# Patient Record
Sex: FEMALE | Race: WHITE | NOT HISPANIC OR LATINO | Employment: FULL TIME | URBAN - METROPOLITAN AREA
[De-identification: names, ages, dates, MRNs, and addresses within clinical notes are randomized per-mention and may not be internally consistent; named-entity substitution may affect disease eponyms.]

---

## 2017-01-14 ENCOUNTER — OFFICE VISIT (OUTPATIENT)
Dept: URGENT CARE | Facility: PHYSICIAN GROUP | Age: 22
End: 2017-01-14
Payer: COMMERCIAL

## 2017-01-14 VITALS
HEART RATE: 134 BPM | DIASTOLIC BLOOD PRESSURE: 62 MMHG | TEMPERATURE: 101.3 F | SYSTOLIC BLOOD PRESSURE: 120 MMHG | HEIGHT: 64 IN | BODY MASS INDEX: 24.24 KG/M2 | RESPIRATION RATE: 20 BRPM | WEIGHT: 142 LBS | OXYGEN SATURATION: 99 %

## 2017-01-14 DIAGNOSIS — R50.9 FEVER, UNSPECIFIED FEVER CAUSE: ICD-10-CM

## 2017-01-14 DIAGNOSIS — J11.1 FLU: Primary | ICD-10-CM

## 2017-01-14 LAB
FLUAV+FLUBV AG SPEC QL IA: NORMAL
INT CON NEG: NEGATIVE
INT CON NEG: NEGATIVE
INT CON POS: POSITIVE
INT CON POS: POSITIVE
S PYO AG THROAT QL: NORMAL

## 2017-01-14 PROCEDURE — 87804 INFLUENZA ASSAY W/OPTIC: CPT | Performed by: FAMILY MEDICINE

## 2017-01-14 PROCEDURE — 87880 STREP A ASSAY W/OPTIC: CPT | Performed by: FAMILY MEDICINE

## 2017-01-14 PROCEDURE — 99214 OFFICE O/P EST MOD 30 MIN: CPT | Performed by: FAMILY MEDICINE

## 2017-01-14 RX ORDER — IBUPROFEN 200 MG
800 TABLET ORAL ONCE
Status: COMPLETED | OUTPATIENT
Start: 2017-01-14 | End: 2017-01-14

## 2017-01-14 RX ORDER — OSELTAMIVIR PHOSPHATE 75 MG/1
75 CAPSULE ORAL EVERY 12 HOURS
Qty: 10 CAP | Refills: 0 | Status: SHIPPED | OUTPATIENT
Start: 2017-01-14 | End: 2017-01-19

## 2017-01-14 RX ORDER — IBUPROFEN 800 MG/1
800 TABLET ORAL EVERY 8 HOURS PRN
Qty: 20 TAB | Refills: 3 | Status: SHIPPED | OUTPATIENT
Start: 2017-01-14 | End: 2017-11-16

## 2017-01-14 RX ADMIN — Medication 800 MG: at 11:02

## 2017-01-14 ASSESSMENT — ENCOUNTER SYMPTOMS
ORTHOPNEA: 0
MYALGIAS: 1
FEVER: 1
COUGH: 1
SORE THROAT: 1
CHILLS: 0
DIZZINESS: 0
FOCAL WEAKNESS: 0
SPUTUM PRODUCTION: 0

## 2017-01-14 NOTE — MR AVS SNAPSHOT
"        Mindi Velazquez   2017 9:45 AM   Office Visit   MRN: [id number]    Department:  [facility]   Dept Phone:  [phone]    Description:  Female : 1995   Provider:  Sergey Rodríguez M.D.           Reason for Visit     Pharyngitis sore throat, fever, chils, cough and [id number]      Allergies as of 2017     Allergen Noted Reactions    Amoxicillin 04/10/2012   Hives    Cillins [Penicillins] 2016   Anaphylaxis      You were diagnosed with     Flu   [670450]  -  Primary     Fever, unspecified fever cause   [[id number]]         Vital Signs     Blood Pressure Pulse Temperature Respirations Height Weight    120/62 mmHg 134 38.5 °C (101.3 °F) 20 1.626 m (5' 4.02\") 64.411 kg (142 lb)    Body Mass Index Oxygen Saturation Smoking Status             24.36 kg/m2 99% Current Every Day Smoker         Basic Information     Date Of Birth Sex Race Ethnicity Preferred Language    1995 Female White Non- English      Health Maintenance        Date Due Completion Dates    IMM HEP B VACCINE (1 of 3 - Primary Series) 1996 ---    IMM HEP A VACCINE (1 of 2 - Standard Series) 1997 ---    IMM VARICELLA (CHICKENPOX) VACCINE (1 of 2 - 2 Dose Adolescent Series) 2009 ---    IMM MENINGOCOCCAL VACCINE (MCV4) (1 of 1) 2012 ---    IMM DTaP/Tdap/Td Vaccine (1 - Tdap) 2015 ---    IMM INFLUENZA (1) 2016 ---            Results     POCT Influenza A/B      Component    Rapid Influenza A-B    pos A    Internal Control Positive    Positive    Internal Control Negative    Negative                        Current Immunizations     HPV Quadrivalent Vaccine (GARDASIL) 2014, 2014, 2013      Below and/or attached are the medications your provider expects you to take. Review all of your home medications and newly ordered medications with your provider and/or pharmacist. Follow medication instructions as directed by your provider and/or pharmacist. Please keep your " medication list with you and share with your provider. Update the information when medications are discontinued, doses are changed, or new medications (including over-the-counter products) are added; and carry medication information at all times in the event of emergency situations     Allergies:  AMOXICILLIN - Hives     CILLINS - Anaphylaxis               Medications  Valid as of: January 14, 2017 - 10:26 AM    Generic Name Brand Name Tablet Size Instructions for use    Clindamycin HCl   Take  by mouth.        Doxycycline Hyclate (Tab) VIBRAMYCIN 100 MG Take 1 Tab by mouth 2 times a day.        Hydrocodone-Acetaminophen (Tab) NORCO 5-325 MG Take 1 Tab by mouth every 6 hours as needed.        Ibuprofen (Tab) MOTRIN 800 MG Take 1 Tab by mouth every 8 hours as needed.        Ipratropium-Albuterol (Aerosol) COMBIVENT  MCG/ACT Inhale 2 Puffs by mouth 4 times a day.        MedroxyPROGESTERone Acetate   by Intramuscular route.        Oseltamivir Phosphate (Cap) TAMIFLU 75 MG Take 1 Cap by mouth every 12 hours for 5 days.        Phenyleph-Promethazine-Cod (Syrup) PHENERGAN VC CODEINE 5-6.25-10 MG/5ML Take 5 mL by mouth every 8 hours as needed.        .                 Medicines prescribed today were sent to:     [facility]ACY #[id number] - [place] - [address]    [address] [place] [address]    Phone: [phone] Fax: [phone]    Open 24 Hours?: No      Medication refill instructions:       If your prescription bottle indicates you have medication refills left, it is not necessary to call your provider’s office. Please contact your pharmacy and they will refill your medication.    If your prescription bottle indicates you do not have any refills left, you may request refills at any time through one of the following ways: The online Mango system (except Urgent Care), by calling your provider’s office, or by asking your pharmacy to contact your provider’s office with a refill request. Medication refills are  processed only during regular business hours and may not be available until the next business day. Your provider may request additional information or to have a follow-up visit with you prior to refilling your medication.   *Please Note: Medication refills are assigned a new Rx number when refilled electronically. Your pharmacy may indicate that no refills were authorized even though a new prescription for the same medication is available at the pharmacy. Please request the medicine by name with the pharmacy before contacting your provider for a refill.           [employer] Access Code: [email or web address]  Expires: 2/13/2017  9:41 AM    [employer]  A secure, online tool to manage your health information     [employer]’s [employer]® is a secure, online tool that connects you to your personalized health information from the privacy of your home -- day or night - making it very easy for you to manage your healthcare. Once the activation process is completed, you can even access your medical information using the [employer] melisa, which is available for free in the Apple Melisa store or Google Play store.     [employer] provides the following levels of access (as shown below):   My Chart Features   [facility] Doctor [facility]  Specialists [facility]  Urgent  Care Non-[facility]  Primary Care  Doctor   Email your healthcare team securely and privately 24/7 X X X    Manage appointments: schedule your next appointment; view details of past/upcoming appointments X      Request prescription refills. X      View recent personal medical records, including lab and immunizations X X X X   View health record, including health history, allergies, medications X X X X   Read reports about your outpatient visits, procedures, consult and ER notes X X X X   See your discharge summary, which is a recap of your hospital and/or ER visit that includes your diagnosis, lab results, and care plan. X X       How to register for [employer]:  1. Go to   [email or web address].  2. Click on the Sign Up Now box, which takes you to the New Member Sign Up page. You will need to provide the following information:  a. Enter your Sqrrl Access Code exactly as it appears at the top of this page. (You will not need to use this code after you’ve completed the sign-up process. If you do not sign up before the expiration date, you must request a new code.)   b. Enter your date of birth.   c. Enter your home email address.   d. Click Submit, and follow the next screen’s instructions.  3. Create a Sqrrl ID. This will be your Sqrrl login ID and cannot be changed, so think of one that is secure and easy to remember.  4. Create a [employer] password. You can change your password at any time.  5. Enter your Password Reset Question and Answer. This can be used at a later time if you forget your password.   6. Enter your e-mail address. This allows you to receive e-mail notifications when new information is available in Sqrrl.  7. Click Sign Up. You can now view your health information.    For assistance activating your Sqrrl account, call [phone]

## 2017-01-14 NOTE — PROGRESS NOTES
"Subjective:      Mindi Velazquez is a 21 y.o. female who presents with Pharyngitis    Chief Complaint   Patient presents with   • Pharyngitis     sore throat, fever, chils, cough and [id number]        - 1-2 days cough coryza ST fever aches/malaise.               Pharyngitis   Associated symptoms include congestion and coughing.       Review of Systems   Constitutional: Positive for fever and malaise/fatigue. Negative for chills.   HENT: Positive for congestion and sore throat.    Respiratory: Positive for cough. Negative for sputum production.    Cardiovascular: Negative for chest pain and orthopnea.   Musculoskeletal: Positive for myalgias.   Neurological: Negative for dizziness and focal weakness.          Objective:     /62 mmHg  Pulse 134  Temp(Src) 38.5 °C (101.3 °F)  Resp 20  Ht 1.626 m (5' 4.02\")  Wt 64.411 kg (142 lb)  BMI 24.36 kg/m2  SpO2 99%     Physical Exam   Constitutional: She appears well-developed. No distress.   HENT:   Head: Normocephalic and atraumatic.   Mouth/Throat: Oropharynx is clear and moist.   Cardiovascular: Regular rhythm.    No murmur heard.  Pulmonary/Chest: Effort normal and breath sounds normal.   Neurological: She is alert.   Skin: Skin is warm and dry.   Psychiatric: She has a normal mood and affect. Judgment normal.   Nursing note and vitals reviewed.              Assessment/Plan:         1. Flu  oseltamivir (TAMIFLU) 75 MG Cap    prometh-phenylephrine-codeine (PHENERGAN VC CODEINE) 5-6.25-10 MG/5ML Syrup    ibuprofen (MOTRIN) 800 MG Tab   2. Fever, unspecified fever cause  POCT Influenza A/B    POCT Rapid Strep A    ibuprofen (MOTRIN) tablet 800 mg             Dx & d/c instructions discussed w/ patient and/or family members. Follow up w/ Prvt Dr or here in 3-4 days if not getting better, sooner if needed,  ER if worse and UC/PCP unavailable.        Possible side effects (i.e. Rash, GI upset/constipation, sedation, elevation of BP or sugars) of any " medications given discussed.

## 2017-04-08 ENCOUNTER — OFFICE VISIT (OUTPATIENT)
Dept: URGENT CARE | Facility: PHYSICIAN GROUP | Age: 22
End: 2017-04-08
Payer: COMMERCIAL

## 2017-04-08 VITALS
RESPIRATION RATE: 18 BRPM | SYSTOLIC BLOOD PRESSURE: 120 MMHG | HEART RATE: 94 BPM | HEIGHT: 64 IN | WEIGHT: 144 LBS | BODY MASS INDEX: 24.59 KG/M2 | OXYGEN SATURATION: 99 % | TEMPERATURE: 99.1 F | DIASTOLIC BLOOD PRESSURE: 74 MMHG

## 2017-04-08 DIAGNOSIS — J01.00 ACUTE NON-RECURRENT MAXILLARY SINUSITIS: ICD-10-CM

## 2017-04-08 PROCEDURE — 99214 OFFICE O/P EST MOD 30 MIN: CPT | Performed by: PHYSICIAN ASSISTANT

## 2017-04-08 RX ORDER — AZITHROMYCIN 250 MG/1
TABLET, FILM COATED ORAL
Qty: 6 TAB | Refills: 0 | Status: SHIPPED | OUTPATIENT
Start: 2017-04-08 | End: 2017-11-16

## 2017-04-08 ASSESSMENT — ENCOUNTER SYMPTOMS
SINUS PRESSURE: 1
CHILLS: 1
SPUTUM PRODUCTION: 0
DIARRHEA: 0
WHEEZING: 0
SHORTNESS OF BREATH: 0
FEVER: 0
HEADACHES: 1
SWOLLEN GLANDS: 1
VOMITING: 0
PALPITATIONS: 0
SORE THROAT: 1
DIZZINESS: 0
COUGH: 1
NAUSEA: 0
ABDOMINAL PAIN: 0

## 2017-04-08 NOTE — PROGRESS NOTES
Subjective:      Mindi Velazquez is a 21 y.o. female who presents with Sinus Problem            Sinus Problem  This is a new problem. The current episode started in the past 7 days. The problem has been gradually worsening since onset. There has been no fever. The fever has been present for less than 1 day. Associated symptoms include chills, congestion, coughing, ear pain, headaches, sinus pressure, a sore throat and swollen glands. Pertinent negatives include no shortness of breath. Past treatments include nothing. The treatment provided no relief.       PMH:  has a past medical history of Allergy; GERD (gastroesophageal reflux disease); Headache(784.0); Migraine; and IBD (inflammatory bowel disease). She also has no past medical history of ASTHMA, Seizure (CMS-[facility]), or Diabetes.  MEDS:   Current outpatient prescriptions:   •  MedroxyPROGESTERone Acetate (DEPO-PROVERA IM), by Intramuscular route., Disp: , Rfl:   •  CLINDAMYCIN HCL PO, Take  by mouth., Disp: , Rfl:   •  ibuprofen (MOTRIN) 800 MG Tab, Take 1 Tab by mouth every 8 hours as needed., Disp: 20 Tab, Rfl: 3  •  prometh-phenylephrine-codeine (PHENERGAN VC CODEINE) 5-6.25-10 MG/5ML Syrup, Take 5 mL by mouth every 8 hours as needed., Disp: 120 mL, Rfl: 0  •  hydrocodone-acetaminophen (NORCO) 5-325 MG Tab per tablet, Take 1 Tab by mouth every 6 hours as needed., Disp: 15 Tab, Rfl: 0  •  doxycycline (VIBRAMYCIN) 100 MG Tab, Take 1 Tab by mouth 2 times a day., Disp: 20 Tab, Rfl: 0  •  albuterol-ipratropium (COMBIVENT)  MCG/ACT AERO, Inhale 2 Puffs by mouth 4 times a day., Disp: 1 Inhaler, Rfl: 3  ALLERGIES:   Allergies   Allergen Reactions   • Amoxicillin Hives   • Cillins [Penicillins] Anaphylaxis     SURGHX: No past surgical history on file.  SOCHX:  reports that she has been smoking Cigarettes.  She has been smoking about 0.50 packs per day. She has never used smokeless tobacco. She reports that she does not drink alcohol or use illicit drugs.  FH:  "family history includes Cancer in her maternal aunt and paternal grandfather; Genetic in her maternal grandmother; Hypertension in her father; Psychiatry in her mother.      Review of Systems   Constitutional: Positive for chills and malaise/fatigue. Negative for fever.   HENT: Positive for congestion, ear pain, sinus pressure and sore throat.    Respiratory: Positive for cough. Negative for sputum production, shortness of breath and wheezing.    Cardiovascular: Negative for chest pain, palpitations and leg swelling.   Gastrointestinal: Negative for nausea, vomiting, abdominal pain and diarrhea.   Neurological: Positive for headaches. Negative for dizziness.       Medications, Allergies, and current problem list reviewed today in Epic     Objective:     /74 mmHg  Pulse 94  Temp(Src) 37.3 °C (99.1 °F)  Resp 18  Ht 1.626 m (5' 4.02\")  Wt 65.318 kg (144 lb)  BMI 24.71 kg/m2  SpO2 99%     Physical Exam   Constitutional: She is oriented to person, place, and time. She appears well-developed and well-nourished. No distress.   HENT:   Head: Normocephalic and atraumatic.   Right Ear: Hearing, tympanic membrane, external ear and ear canal normal. Tympanic membrane is not erythematous. No decreased hearing is noted.   Left Ear: Hearing, tympanic membrane, external ear and ear canal normal. Tympanic membrane is not erythematous. No decreased hearing is noted.   Nose: Right sinus exhibits maxillary sinus tenderness. Left sinus exhibits maxillary sinus tenderness.   Mouth/Throat: Normal dentition. Posterior oropharyngeal erythema present. No oropharyngeal exudate.   Eyes: Conjunctivae and EOM are normal. Pupils are equal, round, and reactive to light. Right eye exhibits no discharge. Left eye exhibits no discharge. No scleral icterus.   Neck: Normal range of motion. Neck supple.   Cardiovascular: Normal rate, regular rhythm and normal heart sounds.    No murmur heard.  Pulmonary/Chest: Effort normal and breath " sounds normal. No respiratory distress. She has no wheezes.   Lymphadenopathy:        Head (right side): Submandibular adenopathy present.        Head (left side): Submandibular adenopathy present.     She has no cervical adenopathy.        Right cervical: No superficial cervical adenopathy present.       Left cervical: No superficial cervical adenopathy present.   Neurological: She is alert and oriented to person, place, and time.   Skin: Skin is warm, dry and intact. She is not diaphoretic. No cyanosis. Nails show no clubbing.   Psychiatric: She has a normal mood and affect. Her behavior is normal. Judgment and thought content normal.   Nursing note and vitals reviewed.              Assessment/Plan:     1. Acute non-recurrent maxillary sinusitis  azithromycin (ZITHROMAX) 250 MG Tab    Phenyleph-Doxylamine-DM-APAP (NYQUIL SEVERE COLD/FLU) 5-6.25- MG/15ML Liquid     Take full course of antibiotic  Tylenol and Motrin for fever and pain  OTC meds as discussed including oral decongestant if tolerated  Increase fluids and rest  Nasal spray, nasal wash, Tracy pot    Return to clinic or go to ED if symptoms worsen or persist. Indications for ED discussed at length. Patient voices understanding. Follow-up with your primary care provider in 3-5 days. Red flags discussed.    Please note that this dictation was created using voice recognition software. I have made every reasonable attempt to correct obvious errors, but I expect that there are errors of grammar and possibly content that I did not discover before finalizing the note.

## 2017-04-08 NOTE — MR AVS SNAPSHOT
"        Mindi Velazquez   2017 10:15 AM   Office Visit   MRN: [id number]    Department:  [facility]   Dept Phone:  [phone]    Description:  Female : 1995   Provider:  Rene Centeno PA-C           Reason for Visit     Sinus Problem nasal congestion, sinus pressure, cough, ear pain and [id number]      Allergies as of 2017     Allergen Noted Reactions    Amoxicillin 04/10/2012   Hives    Cillins [Penicillins] 2016   Anaphylaxis      You were diagnosed with     Acute non-recurrent maxillary sinusitis   [[id number]]         Vital Signs     Blood Pressure Pulse Temperature Respirations Height Weight    120/74 mmHg 94 37.3 °C (99.1 °F) 18 1.626 m (5' 4.02\") 65.318 kg (144 lb)    Body Mass Index Oxygen Saturation Smoking Status             24.71 kg/m2 99% Current Every Day Smoker         Basic Information     Date Of Birth Sex Race Ethnicity Preferred Language    1995 Female White Non- English      Health Maintenance        Date Due Completion Dates    IMM HEP B VACCINE (1 of 3 - Primary Series) 1996 ---    IMM HEP A VACCINE (1 of 2 - Standard Series) 1997 ---    IMM VARICELLA (CHICKENPOX) VACCINE (1 of 2 - 2 Dose Adolescent Series) 2009 ---    IMM MENINGOCOCCAL VACCINE (MCV4) (1 of 1) 2012 ---    IMM DTaP/Tdap/Td Vaccine (1 - Tdap) 2015 ---            Current Immunizations     HPV Quadrivalent Vaccine (GARDASIL) 2014, 2014, 2013      Below and/or attached are the medications your provider expects you to take. Review all of your home medications and newly ordered medications with your provider and/or pharmacist. Follow medication instructions as directed by your provider and/or pharmacist. Please keep your medication list with you and share with your provider. Update the information when medications are discontinued, doses are changed, or new medications (including over-the-counter products) are added; and carry medication " information at all times in the event of emergency situations     Allergies:  AMOXICILLIN - Hives     CILLINS - Anaphylaxis               Medications  Valid as of: April 08, 2017 - 11:29 AM    Generic Name Brand Name Tablet Size Instructions for use    Azithromycin (Tab) ZITHROMAX 250 MG Z-balaji, Use as directed.        Hydrocodone-Acetaminophen (Tab) NORCO 5-325 MG Take 1 Tab by mouth every 6 hours as needed.        Ibuprofen (Tab) MOTRIN 800 MG Take 1 Tab by mouth every 8 hours as needed.        Ipratropium-Albuterol (Aerosol) COMBIVENT  MCG/ACT Inhale 2 Puffs by mouth 4 times a day.        MedroxyPROGESTERone Acetate   by Intramuscular route.        Phenyleph-Doxylamine-DM-APAP (Liquid) Phenyleph-Doxylamine-DM-APAP 5-6.25- MG/15ML Take 1 Dose by mouth at bedtime as needed.        .                 Medicines prescribed today were sent to:     [facility]/PHARMACY #[id number] - [place] - [address]    [address] [place] [address]    Phone: [phone] Fax: [phone]    Open 24 Hours?: No      Medication refill instructions:       If your prescription bottle indicates you have medication refills left, it is not necessary to call your provider’s office. Please contact your pharmacy and they will refill your medication.    If your prescription bottle indicates you do not have any refills left, you may request refills at any time through one of the following ways: The online [employer] system (except Urgent Care), by calling your provider’s office, or by asking your pharmacy to contact your provider’s office with a refill request. Medication refills are processed only during regular business hours and may not be available until the next business day. Your provider may request additional information or to have a follow-up visit with you prior to refilling your medication.   *Please Note: Medication refills are assigned a new Rx number when refilled electronically. Your pharmacy may indicate that no refills were  authorized even though a new prescription for the same medication is available at the pharmacy. Please request the medicine by name with the pharmacy before contacting your provider for a refill.           Imindi Access Code: [email or web address]  Expires: 5/8/2017 11:29 AM    Imindi  A secure, online tool to manage your health information     [employer]’s Imindi® is a secure, online tool that connects you to your personalized health information from the privacy of your home -- day or night - making it very easy for you to manage your healthcare. Once the activation process is completed, you can even access your medical information using the Imindi melisa, which is available for free in the Apple Melisa store or Google Play store.     Imindi provides the following levels of access (as shown below):   My Chart Features   [facility] Doctor [facility]  Specialists [facility]  Urgent  Care Non-[facility]  Primary Care  Doctor   Email your healthcare team securely and privately 24/7 X X X    Manage appointments: schedule your next appointment; view details of past/upcoming appointments X      Request prescription refills. X      View recent personal medical records, including lab and immunizations X X X X   View health record, including health history, allergies, medications X X X X   Read reports about your outpatient visits, procedures, consult and ER notes X X X X   See your discharge summary, which is a recap of your hospital and/or ER visit that includes your diagnosis, lab results, and care plan. X X       How to register for Imindi:  1. Go to  [email or web address].  2. Click on the Sign Up Now box, which takes you to the New Member Sign Up page. You will need to provide the following information:  a. Enter your Imindi Access Code exactly as it appears at the top of this page. (You will not need to use this code after you’ve completed the sign-up process. If you do not sign up before the expiration date, you  must request a new code.)   b. Enter your date of birth.   c. Enter your home email address.   d. Click Submit, and follow the next screen’s instructions.  3. Create a [employer] ID. This will be your Rebel Monkey login ID and cannot be changed, so think of one that is secure and easy to remember.  4. Create a [employer] password. You can change your password at any time.  5. Enter your Password Reset Question and Answer. This can be used at a later time if you forget your password.   6. Enter your e-mail address. This allows you to receive e-mail notifications when new information is available in Rebel [employer]key.  7. Click Sign Up. You can now view your health information.    For assistance activating your [employer]bel [employer] account, call [phone]        Quit Tobacco Information     Do you want to quit using tobacco?    Quitting tobacco decreases risks of cancer, heart and lung disease, increases life expectancy, improves sense of taste and smell, and increases spending money, among other benefits.    If you are thinking about quitting, we can help.  • [facility] Quit Tobacco Program: 710.907.9294  o Program occurs weekly for four weeks and includes pharmacist consultation on products to support quitting smoking or chewing tobacco. A provider referral is needed for pharmacist consultation.  • Tobacco Users Help Hotline: 3-505-QUIT-NOW (416-7730) or https://[place].quitlogix.org/  o Free, confidential telephone and online coaching for [place] residents. Sessions are designed on a schedule that is convenient for you. Eligible clients receive free nicotine replacement therapy.  • Nationally: www.smokefree.gov  o Information and professional assistance to support both immediate and long-term needs as you become, and remain, a non-smoker. Smokefree.gov allows you to choose the help that best fits your needs.

## 2017-08-09 ENCOUNTER — OFFICE VISIT (OUTPATIENT)
Dept: URGENT CARE | Facility: PHYSICIAN GROUP | Age: 22
End: 2017-08-09
Payer: COMMERCIAL

## 2017-08-09 VITALS
OXYGEN SATURATION: 100 % | RESPIRATION RATE: 16 BRPM | TEMPERATURE: 99.7 F | WEIGHT: 170 LBS | SYSTOLIC BLOOD PRESSURE: 106 MMHG | BODY MASS INDEX: 29.02 KG/M2 | HEIGHT: 64 IN | HEART RATE: 70 BPM | DIASTOLIC BLOOD PRESSURE: 70 MMHG

## 2017-08-09 DIAGNOSIS — K04.7 DENTAL ABSCESS: ICD-10-CM

## 2017-08-09 PROCEDURE — 99214 OFFICE O/P EST MOD 30 MIN: CPT | Performed by: NURSE PRACTITIONER

## 2017-08-09 RX ORDER — HYDROCODONE BITARTRATE AND ACETAMINOPHEN 5; 325 MG/1; MG/1
1-2 TABLET ORAL EVERY 6 HOURS PRN
Qty: 12 TAB | Refills: 0 | Status: SHIPPED | OUTPATIENT
Start: 2017-08-09 | End: 2017-11-16

## 2017-08-09 RX ORDER — CLINDAMYCIN HYDROCHLORIDE 300 MG/1
300 CAPSULE ORAL 4 TIMES DAILY
Qty: 40 CAP | Refills: 0 | Status: SHIPPED | OUTPATIENT
Start: 2017-08-09 | End: 2017-08-19

## 2017-08-09 ASSESSMENT — ENCOUNTER SYMPTOMS
VOMITING: 0
HEADACHES: 0
NAUSEA: 0
SINUS PRESSURE: 0
CHILLS: 0
MYALGIAS: 0
FEVER: 0

## 2017-08-09 NOTE — MR AVS SNAPSHOT
"        Mindi Velazquez   2017 6:20 PM   Office Visit   MRN: [id number]    Department:  [facility]   Dept Phone:  [phone]    Description:  Female : 1995   Provider:  YENNY Mayer           Reason for Visit     Mouth Pain C/o oral abcess, swelling, RT ear pain x2 days, got worse yesterday.        Allergies as of 2017     Allergen Noted Reactions    Amoxicillin 04/10/2012   Hives    Cillins [Penicillins] 2016   Anaphylaxis      You were diagnosed with     Dental abscess   [301520]         Vital Signs     Blood Pressure Pulse Temperature Respirations Height Weight    106/70 mmHg 70 37.6 °C (99.7 °F) 16 1.626 m (5' 4.02\") 77.111 kg (170 lb)    Body Mass Index Oxygen Saturation Smoking Status             29.17 kg/m2 100% Current Every Day Smoker         Basic Information     Date Of Birth Sex Race Ethnicity Preferred Language    1995 Female White Non- English      Health Maintenance        Date Due Completion Dates    IMM HEP B VACCINE (1 of 3 - Primary Series) 1996 ---    IMM HEP A VACCINE (1 of 2 - Standard Series) 1997 ---    IMM VARICELLA (CHICKENPOX) VACCINE (1 of 2 - 2 Dose Adolescent Series) 2009 ---    IMM MENINGOCOCCAL VACCINE (MCV4) (1 of 1) 2012 ---    IMM DTaP/Tdap/Td Vaccine (1 - Tdap) 2015 ---    IMM INFLUENZA (1) 2017 ---            Current Immunizations     HPV Quadrivalent Vaccine (GARDASIL) 2014, 2014, 2013      Below and/or attached are the medications your provider expects you to take. Review all of your home medications and newly ordered medications with your provider and/or pharmacist. Follow medication instructions as directed by your provider and/or pharmacist. Please keep your medication list with you and share with your provider. Update the information when medications are discontinued, doses are changed, or new medications (including over-the-counter products) are added; and carry " medication information at all times in the event of emergency situations     Allergies:  AMOXICILLIN - Hives     CILLINS - Anaphylaxis               Medications  Valid as of: August 09, 2017 -  7:09 PM    Generic Name Brand Name Tablet Size Instructions for use    Azithromycin (Tab) ZITHROMAX 250 MG Z-balaji, Use as directed.        Clindamycin HCl (Cap) CLEOCIN 300 MG Take 1 Cap by mouth 4 times a day for 10 days.        Hydrocodone-Acetaminophen (Tab) NORCO 5-325 MG Take 1 Tab by mouth every 6 hours as needed.        Hydrocodone-Acetaminophen (Tab) NORCO 5-325 MG Take 1-2 Tabs by mouth every 6 hours as needed.        Ibuprofen (Tab) MOTRIN 800 MG Take 1 Tab by mouth every 8 hours as needed.        Ibuprofen   Take  by mouth.        Ipratropium-Albuterol (Aerosol) COMBIVENT  MCG/ACT Inhale 2 Puffs by mouth 4 times a day.        MedroxyPROGESTERone Acetate   by Intramuscular route.        Phenyleph-Doxylamine-DM-APAP (Liquid) Phenyleph-Doxylamine-DM-APAP 5-6.25- MG/15ML Take 1 Dose by mouth at bedtime as needed.        .                 Medicines prescribed today were sent to:     [id number] - [place], [place] - [address]    [address] [place] [address]    Phone: [phone] Fax: [phone]    Open 24 Hours?: No      Medication refill instructions:       If your prescription bottle indicates you have medication refills left, it is not necessary to call your provider’s office. Please contact your pharmacy and they will refill your medication.    If your prescription bottle indicates you do not have any refills left, you may request refills at any time through one of the following ways: The online ulike system (except Urgent Care), by calling your provider’s office, or by asking your pharmacy to contact your provider’s office with a refill request. Medication refills are processed only during regular business hours and may not be available until the next business day. Your provider may request  additional information or to have a follow-up visit with you prior to refilling your medication.   *Please Note: Medication refills are assigned a new Rx number when refilled electronically. Your pharmacy may indicate that no refills were authorized even though a new prescription for the same medication is available at the pharmacy. Please request the medicine by name with the pharmacy before contacting your provider for a refill.           MyChart Status: Patient Declined        Quit Tobacco Information     Do you want to quit using tobacco?    Quitting tobacco decreases risks of cancer, heart and lung disease, increases life expectancy, improves sense of taste and smell, and increases spending money, among other benefits.    If you are thinking about quitting, we can help.  • [facility] Quit Tobacco Program: 646.772.2530  o Program occurs weekly for four weeks and includes pharmacist consultation on products to support quitting smoking or chewing tobacco. A provider referral is needed for pharmacist consultation.  • Tobacco Users Help Hotline: 4-384-QUIT-NOW (084-2151) or https://[place].quitlogix.org/  o Free, confidential telephone and online coaching for [place] residents. Sessions are designed on a schedule that is convenient for you. Eligible clients receive free nicotine replacement therapy.  • Nationally: www.smokefree.gov  o Information and professional assistance to support both immediate and long-term needs as you become, and remain, a non-smoker. Smokefree.gov allows you to choose the help that best fits your needs.

## 2017-08-10 NOTE — PROGRESS NOTES
"Subjective:      Mindi Velazquez is a 21 y.o. female who presents with Mouth Pain            HPI Comments: Medications, Allergies and Prior Medical Hx reviewed and updated in [facility].with patient/family today         Dental Pain   This is a new problem. The current episode started in the past 7 days. The problem occurs constantly. The problem has been gradually worsening. The pain is at a severity of 10/10. Associated symptoms include facial pain. Pertinent negatives include no difficulty swallowing, fever, oral bleeding, sinus pressure or thermal sensitivity. She has tried NSAIDs for the symptoms. The treatment provided mild relief.       Review of Systems   Constitutional: Negative for fever and chills.   HENT: Positive for ear pain. Negative for ear discharge, hearing loss and sinus pressure.    Gastrointestinal: Negative for nausea and vomiting.   Musculoskeletal: Negative for myalgias.   Neurological: Negative for headaches.          Objective:     /70 mmHg  Pulse 70  Temp(Src) 37.6 °C (99.7 °F)  Resp 16  Ht 1.626 m (5' 4.02\")  Wt 77.111 kg (170 lb)  BMI 29.17 kg/m2  SpO2 100%     Physical Exam   Constitutional: She appears well-developed and well-nourished. No distress.   HENT:   Head: Normocephalic and atraumatic.       Right Ear: Tympanic membrane and ear canal normal.   Left Ear: Tympanic membrane and ear canal normal.   Mouth/Throat: Uvula is midline and oropharynx is clear and moist. She does not have dentures. No oral lesions. No trismus in the jaw. Normal dentition. Dental abscesses and dental caries present. No uvula swelling.       Eyes: Conjunctivae are normal. Pupils are equal, round, and reactive to light.   Neck: Neck supple.   Cardiovascular: Normal rate, regular rhythm and normal heart sounds.    Pulmonary/Chest: Effort normal and breath sounds normal. No respiratory distress.   Neurological: She is alert.   Awake, alert, answering questions appropriately, moving all extremeties "   Skin: Skin is warm and dry. No rash noted.   Psychiatric: She has a normal mood and affect. Her behavior is normal.   Vitals reviewed.              Assessment/Plan:       1. Dental abscess  clindamycin (CLEOCIN) 300 MG Cap    hydrocodone-acetaminophen (NORCO) 5-325 MG Tab per tablet       Do not drink alcohol or operate machinery with this medication  Pt reviewed on [place]  Aware,  no remarkable controlled substance prescription documentation noted    Fluids, tylenol, ibuprofen, antibiotics as directed  Pt will go to the ER for worsening or changing symptoms as discussed, follow-up with your dentist at the next available appointment  Discharge instructions discussed with pt/family who verbalize understanding and agreement with poc

## 2017-09-25 ENCOUNTER — OFFICE VISIT (OUTPATIENT)
Dept: URGENT CARE | Facility: PHYSICIAN GROUP | Age: 22
End: 2017-09-25
Payer: COMMERCIAL

## 2017-09-25 VITALS
DIASTOLIC BLOOD PRESSURE: 72 MMHG | WEIGHT: 167 LBS | BODY MASS INDEX: 28.51 KG/M2 | OXYGEN SATURATION: 99 % | TEMPERATURE: 99.2 F | SYSTOLIC BLOOD PRESSURE: 122 MMHG | HEART RATE: 100 BPM | HEIGHT: 64 IN | RESPIRATION RATE: 16 BRPM

## 2017-09-25 DIAGNOSIS — J02.9 PHARYNGITIS, UNSPECIFIED ETIOLOGY: ICD-10-CM

## 2017-09-25 DIAGNOSIS — J06.9 VIRAL URI WITH COUGH: ICD-10-CM

## 2017-09-25 LAB
INT CON NEG: NORMAL
INT CON POS: NORMAL
S PYO AG THROAT QL: NORMAL

## 2017-09-25 PROCEDURE — 87880 STREP A ASSAY W/OPTIC: CPT | Performed by: PHYSICIAN ASSISTANT

## 2017-09-25 PROCEDURE — 99214 OFFICE O/P EST MOD 30 MIN: CPT | Performed by: PHYSICIAN ASSISTANT

## 2017-09-25 RX ORDER — DEXAMETHASONE 4 MG/1
TABLET ORAL
Refills: 0 | COMMUNITY
Start: 2017-08-25 | End: 2017-11-16

## 2017-09-25 RX ORDER — CLINDAMYCIN HYDROCHLORIDE 150 MG/1
CAPSULE ORAL
Refills: 0 | COMMUNITY
Start: 2017-08-25 | End: 2017-11-16

## 2017-09-25 RX ORDER — CLINDAMYCIN HYDROCHLORIDE 300 MG/1
300 CAPSULE ORAL
Refills: 0 | COMMUNITY
Start: 2017-09-07 | End: 2017-11-16

## 2017-09-25 RX ORDER — IBUPROFEN 600 MG/1
TABLET ORAL
Refills: 0 | COMMUNITY
Start: 2017-09-13 | End: 2018-08-06

## 2017-09-25 RX ORDER — GUAIFENESIN AND DEXTROMETHORPHAN HYDROBROMIDE 100; 10 MG/5ML; MG/5ML
10 SOLUTION ORAL EVERY 6 HOURS PRN
Qty: 240 ML | Refills: 0 | Status: SHIPPED | OUTPATIENT
Start: 2017-09-25 | End: 2017-11-16

## 2017-09-25 RX ORDER — FLUTICASONE PROPIONATE 50 MCG
SPRAY, SUSPENSION (ML) NASAL
Qty: 1 BOTTLE | Refills: 1 | Status: SHIPPED | OUTPATIENT
Start: 2017-09-25 | End: 2018-08-06

## 2017-09-25 ASSESSMENT — ENCOUNTER SYMPTOMS
MYALGIAS: 1
NAUSEA: 0
SORE THROAT: 1
FEVER: 1
CARDIOVASCULAR NEGATIVE: 1
PSYCHIATRIC NEGATIVE: 1
RESPIRATORY NEGATIVE: 1
CHILLS: 1
ABDOMINAL PAIN: 0
EYES NEGATIVE: 1
VOMITING: 0
NEUROLOGICAL NEGATIVE: 1
BACK PAIN: 1

## 2017-09-25 ASSESSMENT — PAIN SCALES - GENERAL: PAINLEVEL: 8=MODERATE-SEVERE PAIN

## 2017-09-25 NOTE — LETTER
September 25, 2017         Patient: Mindi Velazquez   YOB: 1995   Date of Visit: 9/25/2017           To Whom it May Concern:    Mindi Velazquez was seen in my clinic on 9/25/2017. She has been prescribed Tussin DM for daytime and Nyquil for bedtime.      If you have any questions or concerns, please don't hesitate to call.        Sincerely,           Jorge Gamble P.A.-C.  Electronically Signed

## 2017-09-26 NOTE — PATIENT INSTRUCTIONS
Flonase and nasal saline irrigation (netti pot or Anselmo Med Sinus Rinse).  Used distilled water or boiled tap water with nasal flushes, not straight tap water.  Humidifier at bedtime.  Hot steam showers to loosen up mucous.  Cough medicine at bedtime.  Lots of fluids, tea with honey.  Ibuprofen for headache, fever, chills.  Be sure to take with food.  Salt water gargles.  Return if worsening: Yellow thicker mucus changes, worsening pain around the eyes and radiates to the teeth, and fever over 101°F.

## 2017-09-26 NOTE — PROGRESS NOTES
Subjective:      Mindi Velazquez is a 21 y.o. female who presents with Flu Like Symptoms (x3 days. Sore throat, ear pain, sinus congestion, body ache, cough, fever chills.)            HPI  Chief Complaint   Patient presents with   • Flu Like Symptoms     x3 days. Sore throat, ear pain, sinus congestion, body ache, cough, fever chills.       HPI:  Mindi Velazquez is a 21 y.o. female who presents with 3 days of sore throat and ear pain and flu like symptoms.  Lots of sick coworkers.  No hx of mono.  Right sided ear pain.  Right sinus congestion.  Nose is clear.  Back of throat on fire.  Has tried ibprofen with no improvement. Patient denies Shortness of breath or wheeze.    Past Medical History:   Diagnosis Date   • Allergy    • GERD (gastroesophageal reflux disease)    • Headache    • IBD (inflammatory bowel disease)    • Migraine        No past surgical history on file.    Family History   Problem Relation Age of Onset   • Psychiatry Mother      Depression   • Hypertension Father    • Cancer Maternal Aunt      Breast   • Genetic Maternal Grandmother      Thyroid   • Cancer Paternal Grandfather      Testicular       Social History     Social History   • Marital status: Single     Spouse name: N/A   • Number of children: N/A   • Years of education: N/A     Occupational History   • Not on file.     Social History Main Topics   • Smoking status: Current Every Day Smoker     Packs/day: 0.50     Types: Cigarettes   • Smokeless tobacco: Never Used   • Alcohol use No   • Drug use: No   • Sexual activity: No     Other Topics Concern   • Not on file     Social History Narrative   • No narrative on file         Current Outpatient Prescriptions:   •  acetaminophen-codeine #3, TAKE 1 TABLET BY MOUTH EVERY 4-6 HOURS AS NEEDED FOR PAIN  •  clindamycin, Take 300 mg by mouth.  •  clindamycin, TAKE 1 TABLET BY MOUTH 4 TIMES A DAY UNTIL GONE  •  dexamethasone, TAKE 1 TABLET BY MOUTH EVERY DAY UNTIL GONE  •  ibuprofen, TAKE 1  "TABLET BY MOUTH EVERY 4-6 HOURS AS NEEDED FOR PAIN  •  IBUPROFEN PO, Take  by mouth.  •  hydrocodone-acetaminophen, 1-2 Tab, Oral, Q6HRS PRN  •  azithromycin, Z-balaji, Use as directed., Not taking  •  Phenyleph-Doxylamine-DM-APAP, 1 Dose, Oral, HS PRN, Not taking  •  ibuprofen, 800 mg, Oral, Q8HRS PRN, not taking  •  hydrocodone-acetaminophen, 1 Tab, Oral, Q6HRS PRN, not taking  •  MedroxyPROGESTERone Acetate (DEPO-PROVERA IM), by Intramuscular route., 8/26/2017  •  albuterol-ipratropium, 2 Puff, Inhalation, 4XDAY, PRN    Allergies   Allergen Reactions   • Amoxicillin Hives   • Cillins [Penicillins] Anaphylaxis        Review of Systems   Constitutional: Positive for chills and fever.   HENT: Positive for congestion and sore throat.    Eyes: Negative.    Respiratory: Negative.    Cardiovascular: Negative.    Gastrointestinal: Negative for abdominal pain, nausea and vomiting.   Genitourinary: Negative.    Musculoskeletal: Positive for back pain and myalgias.   Skin: Negative.    Neurological: Negative.    Endo/Heme/Allergies: Negative.    Psychiatric/Behavioral: Negative.           Objective:     /72   Pulse 100   Temp 37.3 °C (99.2 °F)   Resp 16   Ht 1.626 m (5' 4\")   Wt 75.8 kg (167 lb)   SpO2 99%   Breastfeeding? No   BMI 28.67 kg/m²      Physical Exam       Nursing note and vitals reviewed.    Constitutional:   Appropriately groomed, pleasant affect, well nourished, in NAD.    Head:   Normocephalic, atraumatic.    Eyes:   PERRLA, EOM's full, sclera white, conjunctiva not erythematous, and medial canthus without exudate bilaterally.    Ears:  Auricle and tragus non-tender to manipulation.  No pre-auricular lymphadenopathy or mastoid ttp.  EACs with mild cerumen bilaterally, not erythematous.  TM’s mildly erythematous with cone of light present and umbo and malleolus visible bilaterally.  No bulging or fluid bubbles present in middle ear.  Hearing grossly intact to voice.    Nose:  Nares not patent " bilaterally.  Nasal mucosa edematous clear rhinorrhea bilaterally. No sinus tenderness to percussion.    Throat:  Dentition wnl, mucosa moist without lesions.  Oropharynx mildly erythematous, with mild enlargement of the palatine tonsils bilaterally with subtle left-sided white exudates.    Post nasal drainage present.  Soft palate rises symmetrically bilaterally and uvula midline.      Neck: Neck supple, with mild anterior lymphadenopathy that is soft and mobile to palpation. Thyroid non-palpable without tenderness or nodules. No supraclavicular lymphadenopathy.    Lungs:  Respiratory effort not labored without accessory muscle use.  Lungs with subtle wheezes bilaterally, no rales, and rhonchi that clear to cough.    Heart:  Tachycardic rate, RR, without murmurs rubs or gallops.  Radial and dorsalis pedis pulse 2+ bilaterally.  No LE edema.    Musculoskeletal:  Gait non-antalgic with a narrow base.    Derm:  Skin without rashes or lesions with good turgor pressure.      Psychiatric:  Mood, affect, and judgement appropriate.         Assessment/Plan:     1. Pharyngitis, unspecified etiology  POCT Rapid Strep A   2. Viral URI with cough  Dextromethorphan-Guaifenesin (TUSSIN DM)  MG/5ML Syrup    fluticasone (FLONASE) 50 MCG/ACT nasal spray    Phenyleph-Doxylamine-DM-APAP (NYQUIL SEVERE COLD/FLU) 5-6.25- MG/15ML Liquid      Patient presents with viral URI.  Afebrile not acute appearing on exam. Coryza erythematous oropharynx with subtle left-sided tonsillar exudate. Rapid strep negative. Recommended symptoms support measures. Cough medicine for a day and nighttime. Flonase as needed for sinus congestion. Reviewed signs and symptoms of a bacterial infection when to return to clinic. Note provided per patient's request for medications prescribed.    Patient was in agreement with this treatment plan and seemed to understand without barriers. All questions were encouraged and answered.  Reviewed signs and  symptoms of when to seek emergency medical care.     Please note that this dictation was created using voice recognition software.  I have made every reasonable attempt to correct obvious errors, but I expect there are errors of arun and possibly content that I did not discover before finalizing the note.

## 2017-11-16 ENCOUNTER — OFFICE VISIT (OUTPATIENT)
Dept: URGENT CARE | Facility: PHYSICIAN GROUP | Age: 22
End: 2017-11-16
Payer: COMMERCIAL

## 2017-11-16 VITALS
OXYGEN SATURATION: 99 % | DIASTOLIC BLOOD PRESSURE: 74 MMHG | SYSTOLIC BLOOD PRESSURE: 120 MMHG | HEART RATE: 94 BPM | TEMPERATURE: 99.2 F | HEIGHT: 64 IN | WEIGHT: 163 LBS | RESPIRATION RATE: 12 BRPM | BODY MASS INDEX: 27.83 KG/M2

## 2017-11-16 DIAGNOSIS — R10.13 EPIGASTRIC ABDOMINAL PAIN: ICD-10-CM

## 2017-11-16 LAB
APPEARANCE UR: ABNORMAL
BILIRUB UR STRIP-MCNC: NEGATIVE MG/DL
COLOR UR AUTO: ABNORMAL
GLUCOSE UR STRIP.AUTO-MCNC: NEGATIVE MG/DL
INT CON NEG: NEGATIVE
INT CON POS: POSITIVE
KETONES UR STRIP.AUTO-MCNC: NEGATIVE MG/DL
LEUKOCYTE ESTERASE UR QL STRIP.AUTO: NEGATIVE
NITRITE UR QL STRIP.AUTO: NEGATIVE
PH UR STRIP.AUTO: 6 [PH] (ref 5–8)
POC URINE PREGNANCY TEST: NEGATIVE
PROT UR QL STRIP: NEGATIVE MG/DL
RBC UR QL AUTO: ABNORMAL
SP GR UR STRIP.AUTO: 1.03
UROBILINOGEN UR STRIP-MCNC: NEGATIVE MG/DL

## 2017-11-16 PROCEDURE — 81002 URINALYSIS NONAUTO W/O SCOPE: CPT | Performed by: PHYSICIAN ASSISTANT

## 2017-11-16 PROCEDURE — 81025 URINE PREGNANCY TEST: CPT | Performed by: PHYSICIAN ASSISTANT

## 2017-11-16 PROCEDURE — 99214 OFFICE O/P EST MOD 30 MIN: CPT | Performed by: PHYSICIAN ASSISTANT

## 2017-11-16 RX ORDER — SUCRALFATE 1 G/1
1 TABLET ORAL
Qty: 20 TAB | Refills: 0 | Status: SHIPPED | OUTPATIENT
Start: 2017-11-16 | End: 2018-08-06

## 2017-11-16 NOTE — LETTER
November 16, 2017         Patient: Mindi Velazquez   YOB: 1995   Date of Visit: 11/16/2017           To Whom it May Concern:    Mindi Velazquez was seen in my clinic on 11/16/2017. She is to be off tomorrow to recover.   If you have any questions or concerns, please don't hesitate to call.        Sincerely,           Uma Gonzáles P.A.-C.  Electronically Signed

## 2017-11-17 ENCOUNTER — HOSPITAL ENCOUNTER (OUTPATIENT)
Dept: LAB | Facility: MEDICAL CENTER | Age: 22
End: 2017-11-17
Attending: PHYSICIAN ASSISTANT
Payer: COMMERCIAL

## 2017-11-17 ENCOUNTER — HOSPITAL ENCOUNTER (OUTPATIENT)
Dept: RADIOLOGY | Facility: MEDICAL CENTER | Age: 22
End: 2017-11-17
Attending: PHYSICIAN ASSISTANT
Payer: COMMERCIAL

## 2017-11-17 DIAGNOSIS — R10.13 EPIGASTRIC ABDOMINAL PAIN: ICD-10-CM

## 2017-11-17 LAB
ALBUMIN SERPL BCP-MCNC: 4.2 G/DL (ref 3.2–4.9)
ALBUMIN/GLOB SERPL: 1.8 G/DL
ALP SERPL-CCNC: 47 U/L (ref 30–99)
ALT SERPL-CCNC: 13 U/L (ref 2–50)
ANION GAP SERPL CALC-SCNC: 6 MMOL/L (ref 0–11.9)
AST SERPL-CCNC: 20 U/L (ref 12–45)
BASOPHILS # BLD AUTO: 1 % (ref 0–1.8)
BASOPHILS # BLD: 0.07 K/UL (ref 0–0.12)
BILIRUB SERPL-MCNC: 0.6 MG/DL (ref 0.1–1.5)
BUN SERPL-MCNC: 12 MG/DL (ref 8–22)
CALCIUM SERPL-MCNC: 9.4 MG/DL (ref 8.5–10.5)
CHLORIDE SERPL-SCNC: 112 MMOL/L (ref 96–112)
CO2 SERPL-SCNC: 21 MMOL/L (ref 20–33)
CREAT SERPL-MCNC: 0.75 MG/DL (ref 0.5–1.4)
EOSINOPHIL # BLD AUTO: 0.08 K/UL (ref 0–0.51)
EOSINOPHIL NFR BLD: 1.1 % (ref 0–6.9)
ERYTHROCYTE [DISTWIDTH] IN BLOOD BY AUTOMATED COUNT: 38.8 FL (ref 35.9–50)
GFR SERPL CREATININE-BSD FRML MDRD: >60 ML/MIN/1.73 M 2
GLOBULIN SER CALC-MCNC: 2.4 G/DL (ref 1.9–3.5)
GLUCOSE SERPL-MCNC: 87 MG/DL (ref 65–99)
HCT VFR BLD AUTO: 42.7 % (ref 37–47)
HGB BLD-MCNC: 14.4 G/DL (ref 12–16)
IMM GRANULOCYTES # BLD AUTO: 0.01 K/UL (ref 0–0.11)
IMM GRANULOCYTES NFR BLD AUTO: 0.1 % (ref 0–0.9)
LIPASE SERPL-CCNC: 26 U/L (ref 11–82)
LYMPHOCYTES # BLD AUTO: 2.88 K/UL (ref 1–4.8)
LYMPHOCYTES NFR BLD: 41.4 % (ref 22–41)
MCH RBC QN AUTO: 30.4 PG (ref 27–33)
MCHC RBC AUTO-ENTMCNC: 33.7 G/DL (ref 33.6–35)
MCV RBC AUTO: 90.3 FL (ref 81.4–97.8)
MONOCYTES # BLD AUTO: 0.57 K/UL (ref 0–0.85)
MONOCYTES NFR BLD AUTO: 8.2 % (ref 0–13.4)
NEUTROPHILS # BLD AUTO: 3.35 K/UL (ref 2–7.15)
NEUTROPHILS NFR BLD: 48.2 % (ref 44–72)
NRBC # BLD AUTO: 0 K/UL
NRBC BLD AUTO-RTO: 0 /100 WBC
PLATELET # BLD AUTO: 179 K/UL (ref 164–446)
PMV BLD AUTO: 10.6 FL (ref 9–12.9)
POTASSIUM SERPL-SCNC: 4 MMOL/L (ref 3.6–5.5)
PROT SERPL-MCNC: 6.6 G/DL (ref 6–8.2)
RBC # BLD AUTO: 4.73 M/UL (ref 4.2–5.4)
SODIUM SERPL-SCNC: 139 MMOL/L (ref 135–145)
WBC # BLD AUTO: 7 K/UL (ref 4.8–10.8)

## 2017-11-17 PROCEDURE — 80053 COMPREHEN METABOLIC PANEL: CPT

## 2017-11-17 PROCEDURE — 83690 ASSAY OF LIPASE: CPT

## 2017-11-17 PROCEDURE — 76705 ECHO EXAM OF ABDOMEN: CPT

## 2017-11-17 PROCEDURE — 85025 COMPLETE CBC W/AUTO DIFF WBC: CPT

## 2017-11-17 PROCEDURE — 36415 COLL VENOUS BLD VENIPUNCTURE: CPT

## 2017-11-17 NOTE — PROGRESS NOTES
"Chief Complaint   Patient presents with   • Abdominal Pain     Upper abdominal pain on and off for a month Pt. states that it started today after eating.        HISTORY OF PRESENT ILLNESS: Patient is a 22 y.o. female who presents today for episode of upper abdominal pain after eating.  Patient states it started about 20 mins after, she ate chicken marsala and drank water.  She states she has had \"several episodes\" like this, this not being the worst one but close to it.  She states the pain is still going on but is less severe than initially. Pain is diffuse upper abdomen without favoring left or right.   She has had this going on, unpredictably for the last 2 months but also notes that in last few years she has had episodes.   She denies any changes in stools, frequency or color.   Slight nausea but no vomiting.   No fevers or chills.  No weight loss. No dysphagia.   No particular food causes.  Not worse at night.  Denies burning in chest.  No attempted interventions.     There are no active problems to display for this patient.      Allergies:Amoxicillin and Cillins [penicillins]    Current Outpatient Prescriptions Ordered in [facility]   Medication Sig Dispense Refill   • sucralfate (CARAFATE) 1 GM Tab Take 1 Tab by mouth 4 Times a Day,Before Meals and at Bedtime. 20 Tab 0   • MedroxyPROGESTERone Acetate (DEPO-PROVERA IM) by Intramuscular route.     • ibuprofen (MOTRIN) 600 MG Tab TAKE 1 TABLET BY MOUTH EVERY 4-6 HOURS AS NEEDED FOR PAIN  0   • fluticasone (FLONASE) 50 MCG/ACT nasal spray 1 spray per nostril in the morning and evening, prn congestion 1 Bottle 1   • IBUPROFEN PO Take  by mouth.     • albuterol-ipratropium (COMBIVENT)  MCG/ACT AERO Inhale 2 Puffs by mouth 4 times a day. 1 Inhaler 3     No current Epic-ordered facility-administered medications on file.        Past Medical History:   Diagnosis Date   • Allergy    • GERD (gastroesophageal reflux disease)    • Headache(784.0)    • IBD (inflammatory " "bowel disease)    • Migraine        Social History   Substance Use Topics   • Smoking status: Current Every Day Smoker     Packs/day: 0.50     Types: Cigarettes   • Smokeless tobacco: Never Used   • Alcohol use No       Family Status   Relation Status   • Mother Alive   • Father Alive   • Maternal Aunt Alive   • Maternal Uncle Alive   • Paternal Aunt Alive   • Paternal Uncle Alive   • Maternal Grandmother Alive   • Maternal Grandfather Alive   • Paternal Grandmother Alive   • Paternal Grandfather Alive     Family History   Problem Relation Age of Onset   • Psychiatry Mother      Depression   • Hypertension Father    • Cancer Maternal Aunt      Breast   • Genetic Maternal Grandmother      Thyroid   • Cancer Paternal Grandfather      Testicular       ROS:  Review of Systems   Constitutional: Negative for fever, chills, weight loss and malaise/fatigue.   HENT: Negative for ear pain, nosebleeds, congestion, sore throat and neck pain.    Eyes: Negative for blurred vision.   Respiratory: Negative for cough, sputum production, shortness of breath and wheezing.    Cardiovascular: Negative for chest pain, palpitations, orthopnea and leg swelling.   Gastrointestinal:= SEE HPI  All other systems reviewed and are negative.       Exam:  Blood pressure 120/74, pulse 94, temperature 37.3 °C (99.2 °F), resp. rate 12, height 1.626 m (5' 4\"), weight 73.9 kg (163 lb), SpO2 99 %.  General:  Well nourished, well developed female in NAD  Eyes: PERRLA, EOM within normal limits, no conjunctival injection, no scleral icterus, visual fields and acuity grossly intact.  Ears: Normal shape and symmetry, no tenderness, no discharge. External canals are without any significant edema or erythema. Tympanic membranes are without any inflammation, no effusion. Gross auditory acuity is intact  Nose: Symmetrical, sinuses without tenderness, no discharge.   Mouth: reasonable hygiene, no erythema exudates or tonsillar enlargement.  Neck: no masses, range " of motion within normal limits, no tracheal deviation. No lymphadenopathy  Pulmonary: Normal respiratory effort, no wheezes, crackles, or rhonchi.  Cardiovascular: regular rate and rhythm without murmurs, rubs, or gallops.  Abdomen: Soft, mild epigastric TTP nondistended. Negative Alaniz's sign.  Normal bowel sounds. No hepatosplenomegaly or masses, or hernias. No rebound or guarding.   Skin: No visible rashes or lesion. Warm, pink, dry.   Extremities: no clubbing, cyanosis, or edema.  Neuro: A&O x 3. Speech normal/clear.  Normal gait.     Component Results     Component Value Ref Range & Units Status   WBC 7.0 4.8 - 10.8 K/uL Final   RBC 4.73 4.20 - 5.40 M/uL Final   Hemoglobin 14.4 12.0 - 16.0 g/dL Final   Hematocrit 42.7 37.0 - 47.0 % Final   MCV 90.3 81.4 - 97.8 fL Final   MCH 30.4 27.0 - 33.0 pg Final   MCHC 33.7 33.6 - 35.0 g/dL Final   RDW 38.8 35.9 - 50.0 fL Final   Platelet Count 179 164 - 446 K/uL Final   MPV 10.6 9.0 - 12.9 fL Final   Neutrophils-Polys 48.20 44.00 - 72.00 % Final   Lymphocytes 41.40  22.00 - 41.00 % Final   Monocytes 8.20 0.00 - 13.40 % Final   Eosinophils 1.10 0.00 - 6.90 % Final   Basophils 1.00 0.00 - 1.80 % Final   Immature Granulocytes 0.10 0.00 - 0.90 % Final   Nucleated RBC 0.00 /100 WBC Final   Neutrophils (Absolute) 3.35 2.00 - 7.15 K/uL Final   Comment:   Includes immature neutrophils, if present.   Lymphs (Absolute) 2.88 1.00 - 4.80 K/uL Final   Monos (Absolute) 0.57 0.00 - 0.85 K/uL Final   Eos (Absolute) 0.08 0.00 - 0.51 K/uL Final   Baso (Absolute) 0.07 0.00 - 0.12 K/uL Final   Immature Granulocytes (abs) 0.01 0.00 - 0.11 K/uL Final   NRBC (Absolute) 0.00 K/uL Final     Component Results     Component Value Ref Range & Units Status   Sodium 139 135 - 145 mmol/L Final   Potassium 4.0 3.6 - 5.5 mmol/L Final   Chloride 112 96 - 112 mmol/L Final   Co2 21 20 - 33 mmol/L Final   Anion Gap 6.0 0.0 - 11.9 Final   Glucose 87 65 - 99 mg/dL Final   Bun 12 8 - 22 mg/dL Final    Creatinine 0.75 0.50 - 1.40 mg/dL Final   Calcium 9.4 8.5 - 10.5 mg/dL Final   AST(SGOT) 20 12 - 45 U/L Final   ALT(SGPT) 13 2 - 50 U/L Final   Alkaline Phosphatase 47 30 - 99 U/L Final   Total Bilirubin 0.6 0.1 - 1.5 mg/dL Final   Albumin 4.2 3.2 - 4.9 g/dL Final   Total Protein 6.6 6.0 - 8.2 g/dL Final   Globulin 2.4 1.9 - 3.5 g/dL Final   A-G Ratio 1.8 g/dL Final     Results     Component Value Ref Range & Units Status   Lipase 26 11 - 82 U/L Final       Component Results     Component Value Ref Range & Units Status   POC Color Suni Negative Final   POC Appearance Hazy Negative Final   POC Leukocyte Esterase Negative Negative Final   POC Nitrites Negative Negative Final   POC Urobiligen Negative Negative (0.2) mg/dL Final   POC Protein Negative Negative mg/dL Final   POC Urine PH 6.0 5.0 - 8.0 Final   POC Blood Trace Negative Final   POC Specific Gravity 1.030 <1.005 - >1.030 Final   POC Ketones Negative Negative mg/dL Final   POC Biliruben Negative Negative mg/dL Final   POC Glucose Negative Negative mg/dL Final       Component Results     Component Value Ref Range & Units Status   POC Urine Pregnancy Test Negative Negative Final   Internal Control Positive Positive  Final     Impression       Unremarkable gallbladder ultrasound.   Reading Provider Reading Date   Malika Maradiaga M.D. Nov 17, 2017       Assessment/Plan:  1. Epigastric abdominal pain  CBC WITH DIFFERENTIAL    COMP METABOLIC PANEL    LIPASE    US-GALLBLADDER    sucralfate (CARAFATE) 1 GM Tab    POCT Urinalysis    POCT Pregnancy       -labs and u/s unremarkable as above.   -given carafate night before u/s performed and patient feels it did help to some degree.   -she admits she has been taking a lot of NSAIDs over the last year.  I recommend she d/c this completely at this time.   -rx as above.  Recommend PCP and GI follow up, referral placed  -abdominal pain red flags and ER precautions discussed in detail with patient who expresses  understanding.       Supportive care, differential diagnoses, and indications for immediate follow-up discussed with patient.   Pathogenesis of diagnosis discussed including typical length and natural progression.   Instructed to return to clinic or nearest emergency department for any change in condition, further concerns, or worsening of symptoms.  Patient states understanding of the plan of care and discharge instructions.  Instructed to make an appointment, for follow up, with their primary care provider.      Uma Gonzáles P.A.-C.

## 2017-11-19 RX ORDER — OMEPRAZOLE 20 MG/1
20 CAPSULE, DELAYED RELEASE ORAL DAILY
Qty: 30 CAP | Refills: 0 | Status: SHIPPED | OUTPATIENT
Start: 2017-11-19

## 2018-03-12 ENCOUNTER — OFFICE VISIT (OUTPATIENT)
Dept: URGENT CARE | Facility: PHYSICIAN GROUP | Age: 23
End: 2018-03-12
Payer: COMMERCIAL

## 2018-03-12 VITALS
WEIGHT: 170 LBS | HEIGHT: 64 IN | HEART RATE: 115 BPM | OXYGEN SATURATION: 97 % | SYSTOLIC BLOOD PRESSURE: 126 MMHG | DIASTOLIC BLOOD PRESSURE: 60 MMHG | BODY MASS INDEX: 29.02 KG/M2 | TEMPERATURE: 99.3 F

## 2018-03-12 DIAGNOSIS — R68.89 FLU-LIKE SYMPTOMS: Primary | ICD-10-CM

## 2018-03-12 LAB
FLUAV+FLUBV AG SPEC QL IA: NEGATIVE
INT CON NEG: NEGATIVE
INT CON POS: POSITIVE

## 2018-03-12 PROCEDURE — 99214 OFFICE O/P EST MOD 30 MIN: CPT | Performed by: NURSE PRACTITIONER

## 2018-03-12 PROCEDURE — 87804 INFLUENZA ASSAY W/OPTIC: CPT | Performed by: NURSE PRACTITIONER

## 2018-03-12 RX ORDER — DEXTROMETHORPHAN HYDROBROMIDE AND PROMETHAZINE HYDROCHLORIDE 15; 6.25 MG/5ML; MG/5ML
5 SYRUP ORAL EVERY 4 HOURS PRN
Qty: 90 ML | Refills: 0 | Status: SHIPPED | OUTPATIENT
Start: 2018-03-12 | End: 2018-08-06

## 2018-03-12 RX ORDER — DEXTROMETHORPHAN HYDROBROMIDE AND PROMETHAZINE HYDROCHLORIDE 15; 6.25 MG/5ML; MG/5ML
5 SYRUP ORAL EVERY 4 HOURS PRN
Qty: 90 ML | Refills: 0 | Status: SHIPPED
Start: 2018-03-12 | End: 2018-03-12 | Stop reason: SDUPTHER

## 2018-03-12 ASSESSMENT — ENCOUNTER SYMPTOMS
SPUTUM PRODUCTION: 1
SHORTNESS OF BREATH: 0
COUGH: 1
CHILLS: 1
DIARRHEA: 0
FEVER: 1
SORE THROAT: 1
MYALGIAS: 1
WEAKNESS: 1
NAUSEA: 0
VOMITING: 0
RHINORRHEA: 1

## 2018-03-12 ASSESSMENT — COPD QUESTIONNAIRES: COPD: 0

## 2018-03-12 NOTE — LETTER
March 12, 2018         Patient: Mindi Velazquez   YOB: 1995   Date of Visit: 3/12/2018           To Whom it May Concern:    Mindi Velazquez was seen in my clinic on 3/12/2018. She should be off work for 2-3 days due to illness.     If you have any questions or concerns, please don't hesitate to call.        Sincerely,           GEE Mayer.  Electronically Signed

## 2018-03-13 NOTE — PROGRESS NOTES
"Subjective:      Mindi Velazquez is a 22 y.o. female who presents with Cough (Sinus congestion, possible fever, Lt ear is clogged, body aches x 3 days )            Medications, Allergies and Prior Medical Hx reviewed and updated in [facility].with patient/family today         Cough   This is a new problem. The current episode started in the past 7 days (2-3 days). The problem has been gradually worsening. The cough is productive of sputum. Associated symptoms include chills, a fever, myalgias, nasal congestion, rhinorrhea and a sore throat. Pertinent negatives include no ear pain or shortness of breath. She has tried nothing for the symptoms. The treatment provided no relief. There is no history of asthma, bronchitis, COPD or pneumonia.       Review of Systems   Constitutional: Positive for chills, fever and malaise/fatigue.   HENT: Positive for congestion, rhinorrhea and sore throat. Negative for ear discharge and ear pain.    Respiratory: Positive for cough and sputum production. Negative for shortness of breath.    Gastrointestinal: Negative for diarrhea, nausea and vomiting.   Musculoskeletal: Positive for myalgias.   Neurological: Positive for weakness.          Objective:     /60   Pulse (!) 115   Temp 37.4 °C (99.3 °F)   Ht 1.626 m (5' 4\")   Wt 77.1 kg (170 lb)   SpO2 97%   BMI 29.18 kg/m²      Physical Exam   Constitutional: She appears well-developed and well-nourished. No distress.   HENT:   Head: Normocephalic and atraumatic.   Right Ear: Tympanic membrane and ear canal normal.   Left Ear: Tympanic membrane and ear canal normal.   Nose: Rhinorrhea present.   Mouth/Throat: Uvula is midline and mucous membranes are normal. No trismus in the jaw. No uvula swelling. Posterior oropharyngeal edema and posterior oropharyngeal erythema present. No oropharyngeal exudate.   Eyes: Conjunctivae are normal. Pupils are equal, round, and reactive to light.   Neck: Neck supple.   Cardiovascular: Normal rate, " regular rhythm and normal heart sounds.    Pulmonary/Chest: Effort normal and breath sounds normal. No respiratory distress. She has no wheezes.   Lymphadenopathy:     She has cervical adenopathy.   Neurological: She is alert.   Skin: Skin is warm and dry. Capillary refill takes less than 2 seconds.   Psychiatric: She has a normal mood and affect. Her behavior is normal.   Vitals reviewed.              Assessment/Plan:       1. Flu-like symptoms  POCT Influenza A/B    promethazine-dextromethorphan (PROMETHAZINE-DM) 6.25-15 MG/5ML syrup    DISCONTINUED: promethazine-dextromethorphan (PROMETHAZINE-DM) 6.25-15 MG/5ML syrup           Pt given a plain paper copy of her rx for her drug screens     poct flu - neg    Letter written for 2-3 days off of school/work    Rest, Fluids, tylenol, ibuprofen,  humidified air, and otc decongestants  Pt will go to the ER for worsening or changing symptoms as discussed,   Follow-up with your primary care provider or return here if not improving in 5 - 7 days   Discharge instructions discussed with pt/family who verbalize understanding and agreement with poc

## 2018-03-20 ENCOUNTER — OFFICE VISIT (OUTPATIENT)
Dept: URGENT CARE | Facility: PHYSICIAN GROUP | Age: 23
End: 2018-03-20
Payer: COMMERCIAL

## 2018-03-20 VITALS
OXYGEN SATURATION: 96 % | WEIGHT: 175 LBS | HEIGHT: 64 IN | BODY MASS INDEX: 29.88 KG/M2 | DIASTOLIC BLOOD PRESSURE: 70 MMHG | SYSTOLIC BLOOD PRESSURE: 120 MMHG | HEART RATE: 89 BPM | TEMPERATURE: 99.5 F

## 2018-03-20 DIAGNOSIS — J06.9 PROTRACTED URI: Primary | ICD-10-CM

## 2018-03-20 PROCEDURE — 99214 OFFICE O/P EST MOD 30 MIN: CPT | Performed by: NURSE PRACTITIONER

## 2018-03-20 RX ORDER — FLUTICASONE PROPIONATE 50 MCG
2 SPRAY, SUSPENSION (ML) NASAL DAILY
Qty: 16 G | Refills: 1 | Status: SHIPPED | OUTPATIENT
Start: 2018-03-20 | End: 2018-08-06

## 2018-03-20 RX ORDER — AZITHROMYCIN 250 MG/1
TABLET, FILM COATED ORAL
Qty: 6 TAB | Refills: 0 | Status: SHIPPED | OUTPATIENT
Start: 2018-03-20 | End: 2018-08-06

## 2018-03-20 ASSESSMENT — ENCOUNTER SYMPTOMS
WEAKNESS: 0
WHEEZING: 0
MYALGIAS: 0
RHINORRHEA: 1
CHILLS: 0
COUGH: 1
FEVER: 0
SORE THROAT: 0
SPUTUM PRODUCTION: 1
SHORTNESS OF BREATH: 0
VOMITING: 0
NAUSEA: 0
DIARRHEA: 0

## 2018-03-20 ASSESSMENT — COPD QUESTIONNAIRES: COPD: 0

## 2018-03-21 NOTE — PROGRESS NOTES
"Subjective:      Mindi Velazquez is a 22 y.o. female who presents with Cough (Runny nose, ear feel plugged for over 2 wks. Tx on 3/12 slight improvements )            Medications, Allergies and Prior Medical Hx reviewed and updated in [facility].with patient/family today       Pt seen here 3/12 dx with flu like sx. Returned with continued cough and nasal congestion not improving.       Cough   This is a new problem. The problem has been gradually worsening. The cough is productive of sputum. Associated symptoms include ear congestion, nasal congestion and rhinorrhea. Pertinent negatives include no chills, ear pain, fever, myalgias, sore throat, shortness of breath or wheezing. She has tried prescription cough suppressant for the symptoms. The treatment provided no relief. There is no history of asthma, bronchitis, COPD or pneumonia.       Review of Systems   Constitutional: Positive for malaise/fatigue. Negative for chills and fever.   HENT: Positive for congestion and rhinorrhea. Negative for ear discharge, ear pain and sore throat.    Respiratory: Positive for cough and sputum production. Negative for shortness of breath and wheezing.    Gastrointestinal: Negative for diarrhea, nausea and vomiting.   Musculoskeletal: Negative for myalgias.   Neurological: Negative for weakness.          Objective:     /70   Pulse 89   Temp 37.5 °C (99.5 °F)   Ht 1.626 m (5' 4\")   Wt 79.4 kg (175 lb)   SpO2 96%   BMI 30.04 kg/m²      Physical Exam   Constitutional: She appears well-developed and well-nourished. No distress.   HENT:   Head: Normocephalic and atraumatic.   Right Ear: Tympanic membrane and ear canal normal.   Left Ear: Tympanic membrane and ear canal normal.   Nose: Rhinorrhea present.   Mouth/Throat: Uvula is midline and mucous membranes are normal. No trismus in the jaw. No uvula swelling. Posterior oropharyngeal edema and posterior oropharyngeal erythema present. No oropharyngeal exudate.   Eyes: " Conjunctivae are normal. Pupils are equal, round, and reactive to light.   Neck: Neck supple.   Cardiovascular: Normal rate, regular rhythm and normal heart sounds.    Pulmonary/Chest: Effort normal and breath sounds normal. No respiratory distress. She has no wheezes.   Lymphadenopathy:     She has cervical adenopathy.   Neurological: She is alert.   Skin: Skin is warm and dry.   Psychiatric: She has a normal mood and affect. Her behavior is normal.   Vitals reviewed.              Assessment/Plan:     1. Protracted URI  azithromycin (ZITHROMAX) 250 MG Tab    fluticasone (FLONASE) 50 MCG/ACT nasal spray         Rest, Fluids, tylenol, ibuprofen, humidified air, and otc cough meds  Pt will go to the ER for worsening or changing symptoms as discussed, follow-up with your primary care provider or return here if not improving in 7 days   Discharge instructions discussed with pt/family who verbalize understanding and agreement with poc

## 2018-08-03 ENCOUNTER — TELEPHONE (OUTPATIENT)
Dept: MEDICAL GROUP | Facility: PHYSICIAN GROUP | Age: 23
End: 2018-08-03

## 2018-08-03 NOTE — TELEPHONE ENCOUNTER
Future Appointments       Provider Department Center    8/6/2018 4:15 PM Yolanda Nelson M.D. [facility]        ESTABLISHED PATIENT PRE-VISIT PLANNING     Note: Patient will not be contacted if there is no indication to call.     1.  Reviewed notes from the last few office visits within the medical group: Yes    2.  If any orders were placed at last visit or intended to be done for this visit (i.e. 6 mos follow-up), do we have Results/Consult Notes?        •  Labs - Labs were not ordered at last office visit.       •  Imaging - Imaging was not ordered at last office visit.       •  Referrals - No referrals were ordered at last office visit.    3. Is this appointment scheduled as a Hospital Follow-Up? No    4.  Immunizations were updated in [employer] using [employer]?: Yes       •  [employer] Recommendations: FLU, MENACTRA (MCV4), TD and VARICELLA (Chicken Pox)     5.  Patient is due for the following Health Maintenance Topics:   Health Maintenance Due   Topic Date Due   • IMM MENINGOCOCCAL B VACCINE (1 of 3 - Trumenba 3-Dose Series ) 09/28/2006   • IMM VARICELLA (CHICKENPOX) VACCINE (1 of 2 - 2-dose adolescent series) 09/28/2009   • IMM MENINGOCOCCAL VACCINE (MCV4) (1 of 1 - 2-dose series) 09/28/2012   • IMM DTaP/Tdap/Td Vaccine (1 - Tdap) 09/28/2015   • CHLAMYDIA SCREENING  11/19/2016   • PAP SMEAR  09/28/2017       6.  MDX printed for Provider? NO INS [employer]     7.  Patient was informed to arrive 15 min prior to their scheduled appointment and bring in their medication bottles. Confirmed through automated call

## 2018-08-06 ENCOUNTER — OFFICE VISIT (OUTPATIENT)
Dept: MEDICAL GROUP | Facility: PHYSICIAN GROUP | Age: 23
End: 2018-08-06
Payer: COMMERCIAL

## 2018-08-06 ENCOUNTER — APPOINTMENT (OUTPATIENT)
Dept: RADIOLOGY | Facility: IMAGING CENTER | Age: 23
End: 2018-08-06
Attending: FAMILY MEDICINE
Payer: COMMERCIAL

## 2018-08-06 VITALS
DIASTOLIC BLOOD PRESSURE: 78 MMHG | WEIGHT: 195 LBS | TEMPERATURE: 98.5 F | SYSTOLIC BLOOD PRESSURE: 120 MMHG | OXYGEN SATURATION: 97 % | HEIGHT: 64 IN | BODY MASS INDEX: 33.29 KG/M2 | RESPIRATION RATE: 18 BRPM | HEART RATE: 88 BPM

## 2018-08-06 DIAGNOSIS — R63.5 WEIGHT GAIN: ICD-10-CM

## 2018-08-06 DIAGNOSIS — R06.02 SHORTNESS OF BREATH: ICD-10-CM

## 2018-08-06 DIAGNOSIS — E66.9 OBESITY (BMI 30-39.9): ICD-10-CM

## 2018-08-06 PROCEDURE — 99213 OFFICE O/P EST LOW 20 MIN: CPT | Performed by: FAMILY MEDICINE

## 2018-08-06 PROCEDURE — 71046 X-RAY EXAM CHEST 2 VIEWS: CPT | Mod: TC | Performed by: FAMILY MEDICINE

## 2018-08-06 ASSESSMENT — PATIENT HEALTH QUESTIONNAIRE - PHQ9: CLINICAL INTERPRETATION OF PHQ2 SCORE: 0

## 2018-08-06 NOTE — PROGRESS NOTES
cc:    Chief Complaint   Patient presents with   • Annual Exam   • Breathing Problem     trouble breathing, started before the smoke   • Other     patient is concerned about her weight and wants to stop smoking.         Subjective:     Mindi Velazquez is a 22 y.o. female presenting for:    Obesity/weight gain- patient has struggled with her weight ever since young adulthood but has become much more difficult in the last year.  Her life has become more stressful and she has gained 20 pounds this year.  Also, she saw a GI specialist and was put on omeprazole.  She feels this medication has increased her appetite.  Denies polyuria/polydipsia, rashes, changes in sensation.  No hair loss, hot/cold, constipation/diarrhea.  She has been on Depo-Provera for 4 years.  Never had difficulty with weight gain or appetite in the past with this medication.  She has not been sexually active for the last 2 years.    Chronic shortness of breath- for at least the last 4 years patient has had difficulty with shortness of breath on exertion.  She feels quite often that she cannot catch her breath, or take a big breath in.  She has taken asthma inhalers in the past, and these did help.  She was never diagnosed with asthma in childhood.  She had this problem even when she quit smoking in the past.  She has never done a formal spirometry.  She has not had any menses for the last 2 years due to Depo-Provera.  She denies any recent injuries or blood loss episodes.  She did see GI specialist who started her on a PPI, and they believed that her problem was due to her acid reflux.  But she is taking this medication, and has not seen any improvement in her shortness of breath.    Review of systems:  See above.       Current Outpatient Prescriptions:   •  omeprazole (PRILOSEC) 20 MG delayed-release capsule, Take 1 Cap by mouth every day., Disp: 30 Cap, Rfl: 0  •  MedroxyPROGESTERone Acetate (DEPO-PROVERA IM), by Intramuscular route., Disp: ,  "Rfl:   •  albuterol-ipratropium (COMBIVENT)  MCG/ACT AERO, Inhale 2 Puffs by mouth 4 times a day., Disp: 1 Inhaler, Rfl: 3    Allergies, past medical history, past surgical history, family history, social history reviewed and updated    Objective:     Vitals: /78   Pulse 88   Temp 36.9 °C (98.5 °F)   Resp 18   Ht 1.613 m (5' 3.5\")   Wt 88.5 kg (195 lb)   SpO2 97%   BMI 34.00 kg/m²   General: Alert, pleasant, NAD  HEENT: Normocephalic.  PERRL, EOMI, no icterus or pallor.  Conjunctivae and lids normal. External ears normal. Oropharynx non-erythematous, mucous membranes moist.  Neck supple.  No thyromegaly or masses palpated. No cervical or supraclavicular lymphadenopathy.  Heart: Regular rate and rhythm.  S1 and S2 normal.  No murmurs appreciated.  Respiratory: Normal respiratory effort.  Clear to auscultation bilaterally.  Abdomen: Non-distended, soft  Skin: Warm, dry, no rashes.  Musculoskeletal: Gait is normal.  Moves all extremities well.  Extremities: No leg edema.    Neurological: CN2-12 intact  Psych:  Affect is normal, judgement is good, memory is intact, grooming is appropriate.    Assessment/Plan:     Mindi was seen today for annual exam, breathing problem and other.    Diagnoses and all orders for this visit:    Shortness of breath: Chronic uncontrolled problem.  As symptoms were improved in the past with albuterol, this could very well be asthma.  Will check with chest x-ray and PFTs.  We will also evaluate for anemia and will check CRP for the possibility of inflammatory lung disease, but this is very unlikely.  -     DX-CHEST-2 VIEWS; Future  -     PULMONARY FUNCTION TESTS Test requested: Spirometry with-out & with Bronchodilator  -     CBC WITHOUT DIFFERENTIAL; Future  -     CRP HIGH SENSITIVE (CARDIAC); Future  -     FERRITIN; Future  -     IRON/TOTAL IRON BIND; Future  -     COMP METABOLIC PANEL; Future      Obesity (BMI 30-39.9)/Weight gain:   Patient treated with PPI for acid " reflux.  Likely this medication has successfully treated the indigestion and early satiety that comes with increased acid production, but has increased appetite and weight in this patient.    No symptoms of diabetes mellitus type 2, but patient is in the obese category.  Will check bloods screening for metabolic syndrome as well as hypothyroidism and vitamin deficiency.  Will refer to health improvement program for lifestyle and potentially medical management.    Patient is interested in smoking cessation.  Discussed with patient today that increased appetite and weight gain is a common side effect of this.  Agreed to obtain blood and get started with health improvement program prior to smoking cessation.  But both she and I agree that smoking cessation is very important part of her health care.  -     COMP METABOLIC PANEL; Future  -     TSH WITH REFLEX TO FT4; Future  -     HEMOGLOBIN A1C; Future  -     LIPID PROFILE; Future  -     VITAMIN D,25 HYDROXY; Future  -     VITAMIN B12; Future  -     Patient identified as having weight management issue.  Appropriate orders and counseling given.  -     REFERRAL TO [facility] HEALTH IMPROVEMENT PROGRAMS (HIP) Services Requested: Physician Medical Weight Management Program; Reason for Referral? BMI>30; Reason for Visit: Overweight/Obesity    -We will request records from previous PCP before ordering any health maintenance.    Return in about 4 weeks (around 9/3/2018).

## 2018-08-06 NOTE — LETTER
[employer]  Yolanda Nelson M.D.  [address]  [place] [address]  Fax: [phone]   Authorization for Release/Disclosure of   Protected Health Information   Name: TJ VELAZQUEZ : 1995 SSN: [id number]   Address: [facility] [facility] [place] [address] Phone:    [phone] (home)    I authorize the entity listed below to release/disclose the PHI below to:   [facility]/Yolanda Nelson M.D. and Yolanda Nelson M.D.   Provider or Entity Name:    Dr. Rojas   [facility]   Phone:  [phone]    Fax:     Reason for request: continuity of care   Information to be released:    [  ] LAST COLONOSCOPY,  including any PATH REPORT and follow-up  [  ] LAST FIT/COLOGUARD RESULT [  ] LAST DEXA  [  ] LAST MAMMOGRAM  [  ] LAST PAP  [  ] LAST LABS [  ] RETINA EXAM REPORT  [  ] IMMUNIZATION RECORDS  [  ] Release all info      [  ] Check here and initial the line next to each item to release ALL health information INCLUDING  _____ Care and treatment for drug and / or alcohol abuse  _____ HIV testing, infection status, or AIDS  _____ Genetic Testing    DATES OF SERVICE OR TIME PERIOD TO BE DISCLOSED: _____________  I understand and acknowledge that:  * This Authorization may be revoked at any time by you in writing, except if your health information has already been used or disclosed.  * Your health information that will be used or disclosed as a result of you signing this authorization could be re-disclosed by the recipient. If this occurs, your re-disclosed health information may no longer be protected by State or Federal laws.  * You may refuse to sign this Authorization. Your refusal will not affect your ability to obtain treatment.  * This Authorization becomes effective upon signing and will  on (date) __________.      If no date is indicated, this Authorization will  one (1) year from the signature date.    Name: Tj Velazquez    Signature:   Date:     2018          PLEASE FAX REQUESTED RECORDS BACK TO: [phone]

## 2018-08-17 ENCOUNTER — HOSPITAL ENCOUNTER (OUTPATIENT)
Dept: LAB | Facility: MEDICAL CENTER | Age: 23
End: 2018-08-17
Attending: FAMILY MEDICINE
Payer: COMMERCIAL

## 2018-08-17 DIAGNOSIS — R63.5 WEIGHT GAIN: ICD-10-CM

## 2018-08-17 DIAGNOSIS — R06.02 SHORTNESS OF BREATH: ICD-10-CM

## 2018-08-17 DIAGNOSIS — E66.9 OBESITY (BMI 30-39.9): ICD-10-CM

## 2018-08-17 LAB
25(OH)D3 SERPL-MCNC: 39 NG/ML (ref 30–100)
ALBUMIN SERPL BCP-MCNC: 4.2 G/DL (ref 3.2–4.9)
ALBUMIN/GLOB SERPL: 1.8 G/DL
ALP SERPL-CCNC: 51 U/L (ref 30–99)
ALT SERPL-CCNC: 16 U/L (ref 2–50)
ANION GAP SERPL CALC-SCNC: 6 MMOL/L (ref 0–11.9)
AST SERPL-CCNC: 20 U/L (ref 12–45)
BILIRUB SERPL-MCNC: 0.7 MG/DL (ref 0.1–1.5)
BUN SERPL-MCNC: 13 MG/DL (ref 8–22)
CALCIUM SERPL-MCNC: 9.1 MG/DL (ref 8.5–10.5)
CHLORIDE SERPL-SCNC: 109 MMOL/L (ref 96–112)
CHOLEST SERPL-MCNC: 128 MG/DL (ref 100–199)
CO2 SERPL-SCNC: 25 MMOL/L (ref 20–33)
CREAT SERPL-MCNC: 0.79 MG/DL (ref 0.5–1.4)
CRP SERPL HS-MCNC: 1.8 MG/L (ref 0–7.5)
ERYTHROCYTE [DISTWIDTH] IN BLOOD BY AUTOMATED COUNT: 41.1 FL (ref 35.9–50)
FERRITIN SERPL-MCNC: 44.4 NG/ML (ref 10–291)
GLOBULIN SER CALC-MCNC: 2.3 G/DL (ref 1.9–3.5)
GLUCOSE SERPL-MCNC: 86 MG/DL (ref 65–99)
HCT VFR BLD AUTO: 42.8 % (ref 37–47)
HDLC SERPL-MCNC: 49 MG/DL
HGB BLD-MCNC: 14.3 G/DL (ref 12–16)
IRON SATN MFR SERPL: 32 % (ref 15–55)
IRON SERPL-MCNC: 112 UG/DL (ref 40–170)
LDLC SERPL CALC-MCNC: 69 MG/DL
MCH RBC QN AUTO: 30.4 PG (ref 27–33)
MCHC RBC AUTO-ENTMCNC: 33.4 G/DL (ref 33.6–35)
MCV RBC AUTO: 91.1 FL (ref 81.4–97.8)
PLATELET # BLD AUTO: 182 K/UL (ref 164–446)
PMV BLD AUTO: 11.5 FL (ref 9–12.9)
POTASSIUM SERPL-SCNC: 4.3 MMOL/L (ref 3.6–5.5)
PROT SERPL-MCNC: 6.5 G/DL (ref 6–8.2)
RBC # BLD AUTO: 4.7 M/UL (ref 4.2–5.4)
SODIUM SERPL-SCNC: 140 MMOL/L (ref 135–145)
TIBC SERPL-MCNC: 346 UG/DL (ref 250–450)
TRIGL SERPL-MCNC: 48 MG/DL (ref 0–149)
TSH SERPL DL<=0.005 MIU/L-ACNC: 1.25 UIU/ML (ref 0.38–5.33)
VIT B12 SERPL-MCNC: >1500 PG/ML (ref 211–911)
WBC # BLD AUTO: 7.1 K/UL (ref 4.8–10.8)

## 2018-08-17 PROCEDURE — 80061 LIPID PANEL: CPT

## 2018-08-17 PROCEDURE — 85027 COMPLETE CBC AUTOMATED: CPT

## 2018-08-17 PROCEDURE — 82728 ASSAY OF FERRITIN: CPT

## 2018-08-17 PROCEDURE — 83550 IRON BINDING TEST: CPT

## 2018-08-17 PROCEDURE — 83036 HEMOGLOBIN GLYCOSYLATED A1C: CPT

## 2018-08-17 PROCEDURE — 83540 ASSAY OF IRON: CPT

## 2018-08-17 PROCEDURE — 86141 C-REACTIVE PROTEIN HS: CPT

## 2018-08-17 PROCEDURE — 84443 ASSAY THYROID STIM HORMONE: CPT

## 2018-08-17 PROCEDURE — 82607 VITAMIN B-12: CPT

## 2018-08-17 PROCEDURE — 80053 COMPREHEN METABOLIC PANEL: CPT

## 2018-08-17 PROCEDURE — 82306 VITAMIN D 25 HYDROXY: CPT

## 2018-08-17 PROCEDURE — 36415 COLL VENOUS BLD VENIPUNCTURE: CPT

## 2018-08-18 LAB
EST. AVERAGE GLUCOSE BLD GHB EST-MCNC: 114 MG/DL
HBA1C MFR BLD: 5.6 % (ref 0–5.6)

## 2018-08-31 ENCOUNTER — TELEPHONE (OUTPATIENT)
Dept: MEDICAL GROUP | Facility: PHYSICIAN GROUP | Age: 23
End: 2018-08-31

## 2018-08-31 NOTE — TELEPHONE ENCOUNTER
Future Appointments       Provider Department Center    9/4/2018 3:35 PM Yolanda Nelson M.D. [facility]        ESTABLISHED PATIENT PRE-VISIT PLANNING     Note: Patient will not be contacted if there is no indication to call.     1.  Reviewed notes from the last few office visits within the medical group: Yes    2.  If any orders were placed at last visit or intended to be done for this visit (i.e. 6 mos follow-up), do we have Results/Consult Notes?        •  Labs - Labs ordered, completed on 08/17/18 and results are in chart.       •  Imaging - Imaging ordered, completed and results are in chart.       •  Referrals - Referral ordered, patient has NOT been seen.    3. Is this appointment scheduled as a Hospital Follow-Up? No    4.  Immunizations were updated in [employer] using Web[employer]?: Yes       •  Web Iz Recommendations: FLU, MENACTRA (MCV4), TD and VARICELLA (Chicken Pox)     5.  Patient is due for the following Health Maintenance Topics:   Health Maintenance Due   Topic Date Due   • IMM VARICELLA (CHICKENPOX) VACCINE (2 of 2 - 2-dose childhood series) 10/15/2002   • IMM MENINGOCOCCAL VACCINE (MCV4) (1 of 1 - 2-dose series) 09/28/2012   • IMM PNEUMOCOCCAL  (1 of 1 - PPSV23) 09/28/2015   • CHLAMYDIA SCREENING  11/19/2016   • PAP SMEAR  09/28/2017   • IMM DTaP/Tdap/Td Vaccine (7 - Td) 10/17/2017   • IMM INFLUENZA (1) 09/01/2018       6.  MDX printed for Provider? NO INS [employer]     7.  Patient was informed to arrive 15 min prior to their scheduled appointment and bring in their medication bottles.

## 2018-09-04 ENCOUNTER — OFFICE VISIT (OUTPATIENT)
Dept: MEDICAL GROUP | Facility: PHYSICIAN GROUP | Age: 23
End: 2018-09-04
Payer: COMMERCIAL

## 2018-09-04 VITALS
DIASTOLIC BLOOD PRESSURE: 84 MMHG | HEIGHT: 64 IN | BODY MASS INDEX: 33.46 KG/M2 | HEART RATE: 88 BPM | RESPIRATION RATE: 18 BRPM | SYSTOLIC BLOOD PRESSURE: 128 MMHG | TEMPERATURE: 98.6 F | WEIGHT: 196 LBS | OXYGEN SATURATION: 97 %

## 2018-09-04 DIAGNOSIS — Z11.3 SCREEN FOR STD (SEXUALLY TRANSMITTED DISEASE): ICD-10-CM

## 2018-09-04 DIAGNOSIS — R06.02 SHORTNESS OF BREATH: ICD-10-CM

## 2018-09-04 PROCEDURE — 99214 OFFICE O/P EST MOD 30 MIN: CPT | Performed by: FAMILY MEDICINE

## 2018-09-04 RX ORDER — ALBUTEROL SULFATE 90 UG/1
2 AEROSOL, METERED RESPIRATORY (INHALATION) EVERY 6 HOURS PRN
Qty: 8.5 G | Refills: 0 | Status: SHIPPED | OUTPATIENT
Start: 2018-09-04

## 2018-09-04 NOTE — PROGRESS NOTES
"cc: Chronic shortness of breath      Subjective:     Mindi Velazquez is a 22 y.o. female presenting for the following:     Follow-up of chronic shortness of breath-on last appointment blood tests were ordered in addition to chest x-ray and pulmonary function tests.  Blood tests which were normal without anemia, normal CRP, and normal kidney and liver function.  A chest x-ray was normal.  The patient has not yet had the pulmonary function tests.    She still is having these episodes of shortness of breath.  She will sometimes have a few week.  Especially if she goes outside, she will suddenly have the sensation that she cannot take a large breath.  This does also worry her and she admits that she then will start breathing quickly and more shallowly.  This has been improved with her grandmother's Combivent in the past.  These episodes will last usually just a few minutes.  Many things can bring them on including cold air, certain plants.  They also can come on with no obvious trigger.  She is still smoking.  She denies any chest pain, palpitations, lightheadedness.    Review of systems:  See above.       Current Outpatient Prescriptions:   •  albuterol 108 (90 Base) MCG/ACT Aero Soln inhalation aerosol, Inhale 2 Puffs by mouth every 6 hours as needed for Shortness of Breath., Disp: 8.5 g, Rfl: 0  •  omeprazole (PRILOSEC) 20 MG delayed-release capsule, Take 1 Cap by mouth every day., Disp: 30 Cap, Rfl: 0  •  MedroxyPROGESTERone Acetate (DEPO-PROVERA IM), by Intramuscular route., Disp: , Rfl:     Allergies, past medical history, past surgical history, family history, social history reviewed and updated    Objective:     Vitals: /84   Pulse 88   Temp 37 °C (98.6 °F)   Resp 18   Ht 1.613 m (5' 3.5\")   Wt 88.9 kg (196 lb)   SpO2 97%   BMI 34.18 kg/m²   General: Alert, pleasant, NAD  HEENT: Normocephalic.   Neck supple.  No thyromegaly or masses palpated. No cervical or supraclavicular " lymphadenopathy.  Heart: Regular rate and rhythm.  S1 and S2 normal.  No murmurs appreciated.  Respiratory: Normal respiratory effort.  Clear to auscultation bilaterally.  Psych:  Affect is normal, judgement is good, memory is intact, grooming is appropriate.    Assessment/Plan:     Mindi was seen today for results.    Diagnoses and all orders for this visit:    Shortness of breath-recent testing has ruled out anemia, walking pneumonia, and CRP negative psoas likely inflammatory condition.  History is consistent with intermittent asthma.  Will trial albuterol inhaler 2 puffs, as this has been improved with Combivent in the past.  Patient warned of common side effects of albuterol including agitation, anxiety, racing heart.  Told to not use inhaler for at least 3 days before pulmonary function tests.  -     albuterol 108 (90 Base) MCG/ACT Aero Soln inhalation aerosol; Inhale 2 Puffs by mouth every 6 hours as needed for Shortness of Breath.    Screen for STD (sexually transmitted disease)-patient has appointment for Pap smear and GC/C testing in October.  Has never had an abnormal Pap in the past.  Has been getting since age 19.  She does not remember ever having a blood test for STDs.  Agrees to this today.  She has not been sexually active for the last 2 years.  Denies ever using IV drugs.  -     HIV AG/AB COMBO ASSAY SCREENING; Future  -     T.PALLIDUM AB EIA; Future      Return if symptoms worsen or fail to improve.

## 2018-12-13 ENCOUNTER — HOSPITAL ENCOUNTER (OUTPATIENT)
Dept: PULMONOLOGY | Facility: MEDICAL CENTER | Age: 23
End: 2018-12-13
Attending: FAMILY MEDICINE
Payer: COMMERCIAL

## 2018-12-13 DIAGNOSIS — R06.02 SHORTNESS OF BREATH: ICD-10-CM

## 2018-12-13 PROCEDURE — 94726 PLETHYSMOGRAPHY LUNG VOLUMES: CPT

## 2018-12-13 PROCEDURE — 94726 PLETHYSMOGRAPHY LUNG VOLUMES: CPT | Mod: 26 | Performed by: INTERNAL MEDICINE

## 2018-12-13 PROCEDURE — 94729 DIFFUSING CAPACITY: CPT

## 2018-12-13 PROCEDURE — 94060 EVALUATION OF WHEEZING: CPT

## 2018-12-13 PROCEDURE — 94729 DIFFUSING CAPACITY: CPT | Mod: 26 | Performed by: INTERNAL MEDICINE

## 2018-12-13 PROCEDURE — 94060 EVALUATION OF WHEEZING: CPT | Mod: 26 | Performed by: INTERNAL MEDICINE

## 2018-12-13 ASSESSMENT — PULMONARY FUNCTION TESTS
FEV1/FVC: 80
FEV1/FVC_PERCENT_LLN: 73
FEV1/FVC_PERCENT_PREDICTED: 87
FVC_PERCENT_PREDICTED: 113
FEV1/FVC_PERCENT_LLN: 73
FEV1: 3.64
FEV1/FVC_PERCENT_PREDICTED: 99
FEV1_PERCENT_PREDICTED: 113
FEV1/FVC_PREDICTED: 88
FEV1/FVC_PERCENT_PREDICTED: 92
FEV1_LLN: 2.68
FEV1_PREDICTED: 3.21
FEV1/FVC: 80
FEV1/FVC_PERCENT_PREDICTED: 90
FEV1/FVC_PERCENT_CHANGE: 9
FVC_LLN: 3.07
FVC: 4.16
FVC_PREDICTED: 3.68
FEV1/FVC_PERCENT_PREDICTED: 100
FEV1_PERCENT_CHANGE: 8
FEV1_LLN: 2.68
FEV1/FVC: 87.5
FVC_LLN: 3.07
FVC_PERCENT_PREDICTED: 113
FEV1_PERCENT_PREDICTED: 104
FEV1_PERCENT_CHANGE: 0
FEV1: 3.36
FVC: 4.19
FEV1/FVC: 87

## 2018-12-15 NOTE — PROCEDURES
DATE OF TEST:  12/13/2018.    SPIROMETRY:  Showed normal FVC and FEV1 and normal FEV1/FVC ratio.  There was   no significant response to bronchodilators.  Flow volume loop was normal in   shape and size.    Lung volumes showed moderate air trapping with residual volume of 1.74 liters,   147% of predicted.  Total lung capacity was normal at 116% of predicted.    Diffusion capacity was also slightly increased at 122% of predicted.    IMPRESSION:  Other than slight increase in residual volume and diffusion   capacity, the patient had normal pulmonary function test.  Clinical   correlation is required.       ____________________________________     MD CARLOS Fernando / JOSÉ LUIS    DD:  12/15/2018 13:18:26  DT:  12/15/2018 13:49:02    D#:  [id number]  Job#:  [id number]

## 2019-01-23 ENCOUNTER — TELEPHONE (OUTPATIENT)
Dept: MEDICAL GROUP | Facility: PHYSICIAN GROUP | Age: 24
End: 2019-01-23

## 2019-01-23 NOTE — TELEPHONE ENCOUNTER
PFTs were normal. This does not mean that she does not have an intermittent asthma. Were her symptoms improved with the inhaler?  -Any further questions or still having symptoms, please make her a follow up appointment.

## 2021-05-23 ENCOUNTER — HOSPITAL ENCOUNTER (EMERGENCY)
Dept: HOSPITAL 8 - ED | Age: 26
Discharge: HOME | End: 2021-05-23
Payer: COMMERCIAL

## 2021-05-23 VITALS — DIASTOLIC BLOOD PRESSURE: 72 MMHG | SYSTOLIC BLOOD PRESSURE: 130 MMHG

## 2021-05-23 VITALS — BODY MASS INDEX: 44.04 KG/M2 | HEIGHT: 64 IN | WEIGHT: 257.94 LBS

## 2021-05-23 DIAGNOSIS — R10.13: ICD-10-CM

## 2021-05-23 DIAGNOSIS — K29.00: Primary | ICD-10-CM

## 2021-05-23 DIAGNOSIS — F17.210: ICD-10-CM

## 2021-05-23 DIAGNOSIS — R11.2: ICD-10-CM

## 2021-05-23 DIAGNOSIS — R94.31: ICD-10-CM

## 2021-05-23 LAB
ALBUMIN SERPL-MCNC: 3.7 G/DL (ref 3.4–5)
ALP SERPL-CCNC: 79 U/L (ref 45–117)
ALT SERPL-CCNC: 50 U/L (ref 12–78)
ANION GAP SERPL CALC-SCNC: 6 MMOL/L (ref 5–15)
BASOPHILS # BLD AUTO: 0.1 X10^3/UL (ref 0–0.1)
BASOPHILS NFR BLD AUTO: 1 % (ref 0–1)
BILIRUB SERPL-MCNC: 0.3 MG/DL (ref 0.2–1)
CALCIUM SERPL-MCNC: 8.8 MG/DL (ref 8.5–10.1)
CHLORIDE SERPL-SCNC: 108 MMOL/L (ref 98–107)
CREAT SERPL-MCNC: 0.81 MG/DL (ref 0.55–1.02)
EOSINOPHIL # BLD AUTO: 0.1 X10^3/UL (ref 0–0.4)
EOSINOPHIL NFR BLD AUTO: 1 % (ref 1–7)
ERYTHROCYTE [DISTWIDTH] IN BLOOD BY AUTOMATED COUNT: 13.2 % (ref 9.6–15.2)
LYMPHOCYTES # BLD AUTO: 4.1 X10^3/UL (ref 1–3.4)
LYMPHOCYTES NFR BLD AUTO: 34 % (ref 22–44)
MCH RBC QN AUTO: 30.7 PG (ref 27–34.8)
MCHC RBC AUTO-ENTMCNC: 33.6 G/DL (ref 32.4–35.8)
MD: NO
MONOCYTES # BLD AUTO: 0.8 X10^3/UL (ref 0.2–0.8)
MONOCYTES NFR BLD AUTO: 7 % (ref 2–9)
NEUTROPHILS # BLD AUTO: 6.9 X10^3/UL (ref 1.8–6.8)
NEUTROPHILS NFR BLD AUTO: 58 % (ref 42–75)
PLATELET # BLD AUTO: 229 X10^3/UL (ref 130–400)
PMV BLD AUTO: 9.1 FL (ref 7.4–10.4)
PROT SERPL-MCNC: 7.4 G/DL (ref 6.4–8.2)
RBC # BLD AUTO: 4.95 X10^6/UL (ref 3.82–5.3)

## 2021-05-23 PROCEDURE — 93005 ELECTROCARDIOGRAM TRACING: CPT

## 2021-05-23 PROCEDURE — 80053 COMPREHEN METABOLIC PANEL: CPT

## 2021-05-23 PROCEDURE — 85025 COMPLETE CBC W/AUTO DIFF WBC: CPT

## 2021-05-23 PROCEDURE — 76700 US EXAM ABDOM COMPLETE: CPT

## 2021-05-23 PROCEDURE — 99285 EMERGENCY DEPT VISIT HI MDM: CPT

## 2021-05-23 PROCEDURE — 84703 CHORIONIC GONADOTROPIN ASSAY: CPT

## 2021-05-23 PROCEDURE — 96361 HYDRATE IV INFUSION ADD-ON: CPT

## 2021-05-23 PROCEDURE — 36415 COLL VENOUS BLD VENIPUNCTURE: CPT

## 2021-05-23 PROCEDURE — 83690 ASSAY OF LIPASE: CPT

## 2021-05-23 PROCEDURE — 99406 BEHAV CHNG SMOKING 3-10 MIN: CPT

## 2021-05-23 PROCEDURE — 96374 THER/PROPH/DIAG INJ IV PUSH: CPT

## 2021-05-23 NOTE — NUR
TASK RN: Patient given discharge instructions and they have confirmed that they 
understand the instructions.  Patient ambulatory with steady gait.